# Patient Record
Sex: MALE | Race: WHITE | NOT HISPANIC OR LATINO | Employment: FULL TIME | URBAN - METROPOLITAN AREA
[De-identification: names, ages, dates, MRNs, and addresses within clinical notes are randomized per-mention and may not be internally consistent; named-entity substitution may affect disease eponyms.]

---

## 2017-01-03 ENCOUNTER — OFFICE VISIT (OUTPATIENT)
Dept: OTOLARYNGOLOGY | Facility: CLINIC | Age: 42
End: 2017-01-03
Payer: COMMERCIAL

## 2017-01-03 DIAGNOSIS — R06.83 SNORING: ICD-10-CM

## 2017-01-03 DIAGNOSIS — J31.0 CHRONIC RHINITIS: Primary | ICD-10-CM

## 2017-01-03 PROCEDURE — 99202 OFFICE O/P NEW SF 15 MIN: CPT | Performed by: OTOLARYNGOLOGY

## 2017-01-03 RX ORDER — FLUTICASONE PROPIONATE 50 MCG
1-2 SPRAY, SUSPENSION (ML) NASAL DAILY
Qty: 3 BOTTLE | Refills: 1 | Status: SHIPPED | OUTPATIENT
Start: 2017-01-03 | End: 2018-06-27

## 2017-01-03 ASSESSMENT — ENCOUNTER SYMPTOMS
TINGLING: 0
BRUISES/BLEEDS EASILY: 0
NAUSEA: 0
DOUBLE VISION: 0
HEARTBURN: 0
CONSTITUTIONAL NEGATIVE: 1
DIZZINESS: 0
BLURRED VISION: 0
VOMITING: 0
COUGH: 0
TREMORS: 0
HEMOPTYSIS: 0

## 2017-01-03 ASSESSMENT — PAIN SCALES - GENERAL: PAINLEVEL: NO PAIN (0)

## 2017-01-03 NOTE — MR AVS SNAPSHOT
After Visit Summary   1/3/2017    Jesus Manuel Rogers    MRN: [id number]           Patient Information     Date Of Birth          1975        Visit Information        Provider Department      1/3/2017 9:00 AM Annie Hogue MD [facility]        Today's Diagnoses     Chronic rhinitis    -  1     Snoring            Follow-ups after your visit        Your next 10 appointments already scheduled     Mar 03, 2017  7:30 AM   Return Visit with Annie Hogue MD   [facility] ([facility]    [address] [place] [address] [id number] [phone]              Who to contact     If you have questions or need follow up information about today's clinic visit or your schedule please contact [facility] directly at [phone].  Normal or non-critical lab and imaging results will be communicated to you by LawPath[employer], letter or phone within 4 business days after the clinic has received the results. If you do not hear from us within 7 days, please contact the clinic through LawPathhart or phone. If you have a critical or abnormal lab result, we will notify you by phone as soon as possible.  Submit refill requests through [employer] or call your pharmacy and they will forward the refill request to us. Please allow 3 business days for your refill to be completed.          Additional Information About Your Visit        MyChart Information     [employer] gives you secure access to your electronic health record. If you see a primary care provider, you can also send messages to your care team and make appointments. If you have questions, please call your primary care clinic.  If you do not have a primary care provider, please call [phone] and they will assist you.      [employer] is an electronic gateway that provides easy, online access to your medical records. With [employer], you can request a clinic appointment, read your test results, renew a  prescription or communicate with your care team.     To access your existing account, please contact your [facility] or call [phone] for assistance.         Blood Pressure from Last 3 Encounters:   11/06/15 118/70   03/03/15 112/74   01/03/14 120/86    Weight from Last 3 Encounters:   11/06/15 91.173 kg (201 lb)   03/03/15 90.946 kg (200 lb 8 oz)   01/03/14 91.989 kg (202 lb 12.8 oz)              Today, you had the following     No orders found for display         Today's Medication Changes          These changes are accurate as of: 1/3/17  9:48 AM.  If you have any questions, ask your nurse or doctor.               Start taking these medicines.        Dose/Directions    fluticasone 50 MCG/ACT spray   Commonly known as:  FLONASE   Used for:  Chronic rhinitis        Dose:  1-2 spray   Spray 1-2 sprays into both nostrils daily   Quantity:  3 Bottle   Refills:  1            Where to get your medicines      These medications were sent to [facility] [address] [place] [address] [address] [facility]  [address] [facility] [address]     Phone:  [phone]    - fluticasone 50 MCG/ACT spray             Primary Care Provider Office Phone #    [facility] [phone]       No address on file        Thank you!     Thank you for choosing [facility]  for your care. Our goal is always to provide you with excellent care. Hearing back from our patients is one way we can continue to improve our services. Please take a few minutes to complete the written survey that you may receive in the mail after your visit with us. Thank you!             Your Updated Medication List - Protect others around you: Learn how to safely use, store and throw away your medicines at www.disposemymeds.org.          This list is accurate as of: 1/3/17  9:48 AM.  Always use your most recent med list.                   Brand Name Dispense Instructions for use    fluticasone 50 MCG/ACT  spray    FLONASE    3 Bottle    Spray 1-2 sprays into both nostrils daily

## 2017-01-03 NOTE — PROGRESS NOTES
HPI    This is a 41 year old pleasant patient who has been having snoring for the past a couple of years. Denies any sleep apnea, bleeding from nose, facial pressure, pain, post nasal drip, seasonal changes, runny nose, sneezing or coughing. No sore throat, surgeries, choking, or falling a sleep issues. He has a hx of broken nose a couple of times. Sleeps on his back or sometimes on his stomach. He sometimes chews tobacco. He is not a drinker or smoker.    Review of Systems   Constitutional: Negative.    HENT: Negative.    Eyes: Negative for blurred vision and double vision.   Respiratory: Negative for cough and hemoptysis.    Gastrointestinal: Negative for heartburn, nausea and vomiting.   Skin: Negative.    Neurological: Negative for dizziness, tingling and tremors.   Endo/Heme/Allergies: Negative for environmental allergies. Does not bruise/bleed easily.         Physical Exam   Constitutional: He is well-developed, well-nourished, and in no distress.   HENT:   Head: Normocephalic and atraumatic.   Right Ear: Tympanic membrane, external ear and ear canal normal. No drainage, swelling or tenderness. No middle ear effusion. No decreased hearing is noted.   Left Ear: Tympanic membrane, external ear and ear canal normal. No drainage, swelling or tenderness.  No middle ear effusion. No decreased hearing is noted.   Nose: Mucosal edema present. No rhinorrhea.   Mouth/Throat: Uvula is midline and oropharynx is clear and moist. No oropharyngeal exudate.   Eyes: Pupils are equal, round, and reactive to light.   Neck: Neck supple. No tracheal deviation present. No thyromegaly present.   Lymphadenopathy:     He has no cervical adenopathy.     Slight septal deviation and turbinate hypertrophy.    A/P  Snoring issues.  I will give him a topical nasal steroid and see him back in two months. In the meantime, he will try to see changing sleep position make any difference. He will try to sleep on his side. I do not see any apnea  issues.

## 2017-01-03 NOTE — NURSING NOTE
"Jesus Manuel Rogers's goals for this visit include:   Chief Complaint   Patient presents with     Snoring     hx of nasal fracture       He requests these members of his care team be copied on today's visit information: Clinic, [facility]      PCP: [facility]    Referring Provider:  Referred Self, MD  No address on file    Chief Complaint   Patient presents with     Snoring     hx of nasal fracture       Initial There were no vitals taken for this visit. Estimated body mass index is 28.05 kg/(m^2) as calculated from the following:    Height as of 11/6/15: 1.803 m (5' 11\").    Weight as of 11/6/15: 91.173 kg (201 lb).  BP completed using cuff size: NA (Not Taken)    Do you need any medication refills at today's visit? No    Yandy Jacob RN    "

## 2017-03-03 ENCOUNTER — OFFICE VISIT (OUTPATIENT)
Dept: OTOLARYNGOLOGY | Facility: CLINIC | Age: 42
End: 2017-03-03
Payer: COMMERCIAL

## 2017-03-03 VITALS — SYSTOLIC BLOOD PRESSURE: 139 MMHG | HEART RATE: 64 BPM | DIASTOLIC BLOOD PRESSURE: 89 MMHG

## 2017-03-03 DIAGNOSIS — R06.83 SNORING: ICD-10-CM

## 2017-03-03 DIAGNOSIS — J34.3 NASAL TURBINATE HYPERTROPHY: Primary | ICD-10-CM

## 2017-03-03 PROCEDURE — 99212 OFFICE O/P EST SF 10 MIN: CPT | Performed by: OTOLARYNGOLOGY

## 2017-03-03 NOTE — NURSING NOTE
"Jesus Manuel Rogers's goals for this visit include:   Chief Complaint   Patient presents with     RECHECK       He requests these members of his care team be copied on today's visit information: yes      PCP: Clinic, [facility]    Referring Provider:  No referring provider defined for this encounter.    Chief Complaint   Patient presents with     RECHECK       Initial /89  Pulse 64 Estimated body mass index is 28.03 kg/(m^2) as calculated from the following:    Height as of 11/6/15: 1.803 m (5' 11\").    Weight as of 11/6/15: 91.2 kg (201 lb).  Medication Reconciliation: complete    "

## 2017-03-03 NOTE — MR AVS SNAPSHOT
After Visit Summary   3/3/2017    Jesus Manuel Rogers    MRN: [id number]           Patient Information     Date Of Birth          1975        Visit Information        Provider Department      3/3/2017 7:30 AM Annie Hogue MD [facility]        Today's Diagnoses     Nasal turbinate hypertrophy    -  1    Snoring           Follow-ups after your visit        Who to contact     If you have questions or need follow up information about today's clinic visit or your schedule please contact [facility] directly at [phone].  Normal or non-critical lab and imaging results will be communicated to you by [employer], letter or phone within 4 business days after the clinic has received the results. If you do not hear from us within 7 days, please contact the clinic through [employer] or phone. If you have a critical or abnormal lab result, we will notify you by phone as soon as possible.  Submit refill requests through "[employer] or call your pharmacy and they will forward the refill request to us. Please allow 3 business days for your refill to be completed.          Additional Information About Your Visit        MyChart Information     "[employer] gives you secure access to your electronic health record. If you see a primary care provider, you can also send messages to your care team and make appointments. If you have questions, please call your primary care clinic.  If you do not have a primary care provider, please call [phone] and they will assist you.      "[employer] is an electronic gateway that provides easy, online access to your medical records. With "[employer] you can request a clinic appointment, read your test results, renew a prescription or communicate with your care team.     To access your existing account, please contact your [facility] or call [phone] for assistance.        Care EveryWhere ID     This is your Care EveryWhere  ID. This could be used by other organizations to access your [facility] medical records  [id number]        Your Vitals Were     Pulse                   64            Blood Pressure from Last 3 Encounters:   03/03/17 139/89   11/06/15 118/70   03/03/15 112/74    Weight from Last 3 Encounters:   11/06/15 91.2 kg (201 lb)   03/03/15 90.9 kg (200 lb 8 oz)   01/03/14 92 kg (202 lb 12.8 oz)              Today, you had the following     No orders found for display       Primary Care Provider Office Phone #    [facility] [phone]       No address on file        Thank you!     Thank you for choosing [facility]  for your care. Our goal is always to provide you with excellent care. Hearing back from our patients is one way we can continue to improve our services. Please take a few minutes to complete the written survey that you may receive in the mail after your visit with us. Thank you!             Your Updated Medication List - Protect others around you: Learn how to safely use, store and throw away your medicines at www.disposemymeds.org.          This list is accurate as of: 3/3/17  8:41 AM.  Always use your most recent med list.                   Brand Name Dispense Instructions for use    fluticasone 50 MCG/ACT spray    FLONASE    3 Bottle    Spray 1-2 sprays into both nostrils daily

## 2017-03-03 NOTE — PROGRESS NOTES
HPI    This 41 year old pleasant patient is here for the f/u. He did not use his topical nasal spray regularly. He is not sure if there is any changes in terms of having snoring for the past a couple of years. Denies any sleep apnea, bleeding from nose, facial pressure, pain, post nasal drip, seasonal changes, runny nose, sneezing or coughing. No sore throat, surgeries, choking, or falling a sleep issues. He has no difficulty falling asleep; staying asleep; and waking up. He does not feel tired when he wakes up. He has a hx of broken nose a couple of times. Sleeps on his back or sometimes on his stomach. He sometimes chews tobacco. He is not a drinker or smoker.    Review of Systems   Constitutional: Negative.    HENT: Negative.    Eyes: Negative for blurred vision and double vision.   Respiratory: Negative for cough and hemoptysis.    Gastrointestinal: Negative for heartburn, nausea and vomiting.   Skin: Negative.    Neurological: Negative for dizziness, tingling and tremors.   Endo/Heme/Allergies: Negative for environmental allergies. Does not bruise/bleed easily.         Physical Exam   Constitutional: He is well-developed, well-nourished, and in no distress.   HENT:   Head: Normocephalic and atraumatic.   Right Ear: Tympanic membrane, external ear and ear canal normal. No drainage, swelling or tenderness. No middle ear effusion. No decreased hearing is noted.   Left Ear: Tympanic membrane, external ear and ear canal normal. No drainage, swelling or tenderness.  No middle ear effusion. No decreased hearing is noted.   Nose: Mucosal edema present. No rhinorrhea.   Mouth/Throat: Uvula is midline and oropharynx is clear and moist. No oropharyngeal exudate.   Eyes: Pupils are equal, round, and reactive to light.   Neck: Neck supple. No tracheal deviation present. No thyromegaly present.   Lymphadenopathy:     He has no cervical adenopathy.     Slight septal deviation and turbinate hypertrophy.    A/P  Snoring  issues.  I will encourage him to continue using the topical nasal steroid and see him back in two months. In the f/u, there is concern about his sleep issues and apnea, we may request a sleep study. If his symptoms continue despite the medical treatment, we may consider turbinoplasty and septoplasty.

## 2018-06-27 ENCOUNTER — OFFICE VISIT (OUTPATIENT)
Dept: FAMILY MEDICINE | Facility: CLINIC | Age: 43
End: 2018-06-27
Payer: COMMERCIAL

## 2018-06-27 VITALS
WEIGHT: 211 LBS | TEMPERATURE: 98.5 F | HEART RATE: 81 BPM | HEIGHT: 71 IN | DIASTOLIC BLOOD PRESSURE: 82 MMHG | OXYGEN SATURATION: 96 % | SYSTOLIC BLOOD PRESSURE: 118 MMHG | BODY MASS INDEX: 29.54 KG/M2 | RESPIRATION RATE: 18 BRPM

## 2018-06-27 DIAGNOSIS — M72.2 PLANTAR FASCIITIS: Primary | ICD-10-CM

## 2018-06-27 PROCEDURE — 99213 OFFICE O/P EST LOW 20 MIN: CPT | Performed by: NURSE PRACTITIONER

## 2018-06-27 ASSESSMENT — PAIN SCALES - GENERAL: PAINLEVEL: NO PAIN (0)

## 2018-06-27 NOTE — PROGRESS NOTES
SUBJECTIVE:   Jesus Manuel Rogers is a 43 year old male who presents to clinic today for the following health issues:      Concern - RT heel issue  Onset: a month ago    Description:   Feels like a bruise on the bottom of heel    Intensity: mild    Progression of Symptoms:  improving    Accompanying Signs & Symptoms:  none    Previous history of similar problem:   no    Precipitating factors:   Worsened by: unsure    Alleviating factors:  Improved by: nothing    Therapies Tried and outcome: nothing    Sunday night pretty sore. Today improving. No specific episode to cause heel pain. 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 Wears tennis shoes at home due to hardwood and tile floors. He notes knee/feet pain when he doesn't do this.       Problem list and histories reviewed & adjusted, as indicated.  Additional history: as documented    Patient Active Problem List   Diagnosis     Tobacco abuse     Seborrheic dermatitis     CARDIOVASCULAR SCREENING; LDL GOAL LESS THAN 160     Past Surgical History:   Procedure Laterality Date     NO HISTORY OF SURGERY         Social History   Substance Use Topics     Smoking status: Never Smoker     Smokeless tobacco: Former User     Types: Chew     Alcohol use Yes      Comment: 1 beer weekly     Family History   Problem Relation Age of Onset     Hypertension Mother      Asthma No family hx of      C.A.D. No family hx of      Diabetes No family hx of      Cerebrovascular Disease No family hx of      Breast Cancer No family hx of      Cancer - colorectal No family hx of      Prostate Cancer No family hx of            Reviewed and updated as needed this visit by clinical staff  Tobacco  Allergies  Meds  Problems  Med Hx  Surg Hx  Fam Hx  Soc Hx        Reviewed and updated as needed this visit by Provider  Allergies  Meds  Problems         ROS:  MS    OBJECTIVE:     /82 (BP Location: Right arm, Patient Position:  "Sitting, Cuff Size: Adult Regular)  Pulse 81  Temp 98.5  F (36.9  C) (Oral)  Resp 18  Ht 1.803 m (5' 11\")  Wt 95.7 kg (211 lb)  SpO2 96%  BMI 29.43 kg/m2  Body mass index is 29.43 kg/(m^2).  GENERAL: healthy, alert and no distress  MS: no gross musculoskeletal defects noted. No foot pain but slight soreness on right posterior heel. Bilateral calves soft, non-tender.     Diagnostic Test Results:  none     ASSESSMENT/PLAN:       1. Plantar fasciitis  Likely this. Work on stretching, roll foot over a ball or canned foot, do calf raises with/without using a step. Call if not improving over next few weeks. Consider taping foot in flexed position at night      FUTURE APPOINTMENTS:       - Follow-up visit prn not improving    SABINE Mcduffie, NP-C  [facility]    "

## 2018-06-27 NOTE — MR AVS SNAPSHOT
"              After Visit Summary   6/27/2018    Jesus Manuel Rogers    MRN: [id number]           Patient Information     Date Of Birth          1975        Visit Information        Provider Department      6/27/2018 7:40 AM Anni Melchor NP [facility]        Today's Diagnoses     Plantar fasciitis    -  1       Follow-ups after your visit        Who to contact     If you have questions or need follow up information about today's clinic visit or your schedule please contact [facility] directly at [phone].  Normal or non-critical lab and imaging results will be communicated to you by Soapetshart, letter or phone within 4 business days after the clinic has received the results. If you do not hear from us within 7 days, please contact the clinic through [employer] or phone. If you have a critical or abnormal lab result, we will notify you by phone as soon as possible.  Submit refill requests through [employer] or call your pharmacy and they will forward the refill request to us. Please allow 3 business days for your refill to be completed.          Additional Information About Your Visit        MyChart Information     [employer] gives you secure access to your electronic health record. If you see a primary care provider, you can also send messages to your care team and make appointments. If you have questions, please call your primary care clinic.  If you do not have a primary care provider, please call [phone] and they will assist you.        Care EveryWhere ID     This is your Care EveryWhere ID. This could be used by other organizations to access your [place] medical records  [id number]        Your Vitals Were     Pulse Temperature Respirations Height Pulse Oximetry BMI (Body Mass Index)    81 98.5  F (36.9  C) (Oral) 18 1.803 m (5' 11\") 96% 29.43 kg/m2       Blood Pressure from Last 3 Encounters:   06/27/18 118/82   03/03/17 139/89   11/06/15 118/70    Weight from Last 3 Encounters: "   06/27/18 95.7 kg (211 lb)   11/06/15 91.2 kg (201 lb)   03/03/15 90.9 kg (200 lb 8 oz)              Today, you had the following     No orders found for display       Primary Care Provider Office Phone # Fax #    [facility] [phone] [address] [facility] [address]        Equal Access to Services     LOR MAO : Hadii aad ku hadasho Soomaali, waaxda luqadaha, qaybta kaalmada adeegyada, waxay idiin hayaan adeeg bhakti ibrahim. So [facility] [phone].    ATENCIÓN: Si habla español, tiene a hayes disposición servicios gratuitos de asistencia lingüística. Llame al [phone].    We comply with applicable federal civil rights laws and [place] laws. We do not discriminate on the basis of race, color, national origin, age, disability, sex, sexual orientation, or gender identity.            Thank you!     Thank you for choosing [facility]  for your care. Our goal is always to provide you with excellent care. Hearing back from our patients is one way we can continue to improve our services. Please take a few minutes to complete the written survey that you may receive in the mail after your visit with us. Thank you!             Your Updated Medication List - Protect others around you: Learn how to safely use, store and throw away your medicines at www.disposemymeds.org.      Notice  As of 6/27/2018  8:17 AM    You have not been prescribed any medications.

## 2019-08-23 ENCOUNTER — OFFICE VISIT (OUTPATIENT)
Dept: FAMILY MEDICINE | Facility: CLINIC | Age: 44
End: 2019-08-23
Payer: COMMERCIAL

## 2019-08-23 VITALS
RESPIRATION RATE: 18 BRPM | TEMPERATURE: 98.4 F | DIASTOLIC BLOOD PRESSURE: 80 MMHG | OXYGEN SATURATION: 99 % | WEIGHT: 208.7 LBS | HEIGHT: 71 IN | HEART RATE: 64 BPM | SYSTOLIC BLOOD PRESSURE: 121 MMHG | BODY MASS INDEX: 29.22 KG/M2

## 2019-08-23 DIAGNOSIS — Z00.00 ROUTINE GENERAL MEDICAL EXAMINATION AT A HEALTH CARE FACILITY: Primary | ICD-10-CM

## 2019-08-23 DIAGNOSIS — M79.672 PAIN IN BOTH FEET: ICD-10-CM

## 2019-08-23 DIAGNOSIS — M79.671 PAIN IN BOTH FEET: ICD-10-CM

## 2019-08-23 PROCEDURE — 99396 PREV VISIT EST AGE 40-64: CPT | Performed by: NURSE PRACTITIONER

## 2019-08-23 ASSESSMENT — PAIN SCALES - GENERAL: PAINLEVEL: NO PAIN (0)

## 2019-08-23 ASSESSMENT — MIFFLIN-ST. JEOR: SCORE: 1848.79

## 2019-08-23 NOTE — PROGRESS NOTES
SUBJECTIVE:   CC: Jesus Manuel Rogers is an 44 year old male who presents for preventive health visit.     Healthy Habits:    Do you get at least three servings of calcium containing foods daily (dairy, green leafy vegetables, etc.)? yes    Amount of exercise or daily activities, outside of work: 2 day(s) per week-activity with kids    Problems taking medications regularly No    Medication side effects: No    Have you had an eye exam in the past two years? no    Do you see a dentist twice per year? yes    Do you have sleep apnea, excessive snoring or daytime drowsiness?no, wife may say he snores. Feels rested in the morning    Has some anterior foot aching in the mornings that goes away after moving around. Also some arch pain that comes and goes. Feels he has supportive shoes, he will try stretching. Advised this sounds like arthritis in the feet, maybe plantar fasciitis in arches. He denies injury. Monitor for now. Did not assess feet today for this.     Colonoscopy due when turn 48    Today's PHQ-2 Score:   PHQ-2 ( 1999 [employer]) 8/23/2019 6/27/2018   Q1: Little interest or pleasure in doing things 0 0   Q2: Feeling down, depressed or hopeless 0 0   PHQ-2 Score 0 0       Abuse: Current or Past(Physical, Sexual or Emotional)- No  Do you feel safe in your environment? Yes    Social History     Tobacco Use     Smoking status: Never Smoker     Smokeless tobacco: Former User     Types: Chew   Substance Use Topics     Alcohol use: Yes     Comment: 1 beer weekly     If you drink alcohol do you typically have >3 drinks per day or >7 drinks per week? No                      Last PSA: No results found for: PSA    Reviewed orders with patient. Reviewed health maintenance and updated orders accordingly - Yes  Lab work is in process  Labs reviewed in EPIC    Reviewed and updated as needed this visit by clinical staff  Tobacco  Allergies  Meds  Problems  Med Hx  Surg Hx  Fam Hx  Soc Hx          Reviewed and updated as needed  "this visit by Provider  Tobacco  Allergies  Meds  Problems  Med Hx  Surg Hx  Fam Hx            ROS:  CONSTITUTIONAL: NEGATIVE for fever, chills, change in weight  INTEGUMENTARY/SKIN: NEGATIVE for worrisome rashes, moles or lesions  EYES: NEGATIVE for vision changes or irritation  ENT: NEGATIVE for ear, mouth and throat problems  RESP: NEGATIVE for significant cough or SOB  CV: NEGATIVE for chest pain, palpitations or peripheral edema  GI: NEGATIVE for nausea, abdominal pain, heartburn, or change in bowel habits   male: negative for dysuria, hematuria, decreased urinary stream, erectile dysfunction, urethral discharge  MUSCULOSKELETAL: NEGATIVE for significant arthralgias or myalgia  NEURO: NEGATIVE for weakness, dizziness or paresthesias  PSYCHIATRIC: NEGATIVE for changes in mood or affect    OBJECTIVE:   /80 (BP Location: Right arm, Patient Position: Sitting, Cuff Size: Adult Large)   Pulse 64   Temp 98.4  F (36.9  C) (Oral)   Resp 18   Ht 1.803 m (5' 11\")   Wt 94.7 kg (208 lb 11.2 oz)   SpO2 99%   BMI 29.11 kg/m    EXAM:  GENERAL: healthy, alert and no distress  EYES: Eyes grossly normal to inspection, PERRL and conjunctivae and sclerae normal  HENT: ear canals and TM's normal, nose and mouth without ulcers or lesions  NECK: no adenopathy, no asymmetry, masses, or scars and thyroid normal to palpation  RESP: lungs clear to auscultation - no rales, rhonchi or wheezes  CV: regular rate and rhythm, normal S1 S2, no S3 or S4, no murmur, click or rub, no peripheral edema and peripheral pulses strong  ABDOMEN: soft, nontender, no hepatosplenomegaly, no masses and bowel sounds normal  MS: no gross musculoskeletal defects noted, no edema  SKIN: no suspicious lesions or rashes  NEURO: Normal strength and tone, mentation intact and speech normal  PSYCH: mentation appears normal, affect normal/bright    Diagnostic Test Results:  Labs reviewed in Epic  No results found for this or any previous visit " "(from the past 24 hour(s)).    ASSESSMENT/PLAN:   1. Routine general medical examination at a health care facility  Return for fasting labs  - Lipid panel reflex to direct LDL Fasting; Future  - PSA, screen; Future  - Basic metabolic panel; Future    2. Pain in both feet  Likely arthritis monitor for now. Did not assess today      COUNSELING:  Reviewed preventive health counseling, as reflected in patient instructions    Estimated body mass index is 29.11 kg/m  as calculated from the following:    Height as of this encounter: 1.803 m (5' 11\").    Weight as of this encounter: 94.7 kg (208 lb 11.2 oz).         reports that he has never smoked. He has quit using smokeless tobacco. His smokeless tobacco use included chew.      Counseling Resources:  ATP IV Guidelines  Pooled Cohorts Equation Calculator  FRAX Risk Assessment  ICSI Preventive Guidelines  Dietary Guidelines for Americans, 2010  USDA's MyPlate  ASA Prophylaxis  Lung CA Screening    SABINE Mcduffie, NP-C  [facility]    "

## 2019-08-27 DIAGNOSIS — Z00.00 ROUTINE GENERAL MEDICAL EXAMINATION AT A HEALTH CARE FACILITY: ICD-10-CM

## 2019-08-27 LAB
ANION GAP SERPL CALCULATED.3IONS-SCNC: 6 MMOL/L (ref 3–14)
BUN SERPL-MCNC: 15 MG/DL (ref 7–30)
CALCIUM SERPL-MCNC: 8.9 MG/DL (ref 8.5–10.1)
CHLORIDE SERPL-SCNC: 106 MMOL/L (ref 94–109)
CHOLEST SERPL-MCNC: 201 MG/DL
CO2 SERPL-SCNC: 28 MMOL/L (ref 20–32)
CREAT SERPL-MCNC: 1 MG/DL (ref 0.66–1.25)
GFR SERPL CREATININE-BSD FRML MDRD: 90 ML/MIN/{1.73_M2}
GLUCOSE SERPL-MCNC: 94 MG/DL (ref 70–99)
HDLC SERPL-MCNC: 45 MG/DL
LDLC SERPL CALC-MCNC: 123 MG/DL
NONHDLC SERPL-MCNC: 156 MG/DL
POTASSIUM SERPL-SCNC: 4.9 MMOL/L (ref 3.4–5.3)
PSA SERPL-ACNC: 0.62 UG/L (ref 0–4)
SODIUM SERPL-SCNC: 140 MMOL/L (ref 133–144)
TRIGL SERPL-MCNC: 163 MG/DL

## 2019-08-27 PROCEDURE — 80061 LIPID PANEL: CPT | Performed by: NURSE PRACTITIONER

## 2019-08-27 PROCEDURE — 80048 BASIC METABOLIC PNL TOTAL CA: CPT | Performed by: NURSE PRACTITIONER

## 2019-08-27 PROCEDURE — 36415 COLL VENOUS BLD VENIPUNCTURE: CPT | Performed by: NURSE PRACTITIONER

## 2019-08-27 PROCEDURE — G0103 PSA SCREENING: HCPCS | Performed by: NURSE PRACTITIONER

## 2019-10-07 ENCOUNTER — OFFICE VISIT (OUTPATIENT)
Dept: FAMILY MEDICINE | Facility: CLINIC | Age: 44
End: 2019-10-07
Payer: COMMERCIAL

## 2019-10-07 VITALS
SYSTOLIC BLOOD PRESSURE: 130 MMHG | BODY MASS INDEX: 28.56 KG/M2 | OXYGEN SATURATION: 97 % | HEART RATE: 63 BPM | WEIGHT: 204 LBS | DIASTOLIC BLOOD PRESSURE: 82 MMHG | TEMPERATURE: 98.2 F | HEIGHT: 71 IN | RESPIRATION RATE: 18 BRPM

## 2019-10-07 DIAGNOSIS — K64.5 THROMBOSED EXTERNAL HEMORRHOIDS: Primary | ICD-10-CM

## 2019-10-07 PROCEDURE — 46083 INC THROMBOSED HROID XTRNL: CPT | Performed by: FAMILY MEDICINE

## 2019-10-07 PROCEDURE — 99213 OFFICE O/P EST LOW 20 MIN: CPT | Mod: 25 | Performed by: FAMILY MEDICINE

## 2019-10-07 ASSESSMENT — MIFFLIN-ST. JEOR: SCORE: 1827.47

## 2019-10-07 NOTE — PROGRESS NOTES
"Subjective     Jesus Manuel Rogers is a 44 year old male who presents to clinic today for the following health issues:    HPI   Concern - Hemorrhoids   Onset: 1 week now     Description:   Bleeding present - \"not really pain\". More of a flare up.     Progression of Symptoms:  worsening    Previous history of similar problem:   Over a year or two     Therapies Tried and outcome: OTC creams     Patient reports that he has experienced Hemorrhoids twice before. As of today, he reports that it flared up a week ago. Denies itchiness. He states that he has regular bowel movements with no signs of blood in the stool. He has tried applying Hydrocortisone bid with no relief. Maintains fluids well.      Reviewed and updated as needed this visit by Provider  Tobacco  Allergies  Meds  Med Hx  Surg Hx  Fam Hx  Soc Hx        Review of Systems   10 point ROS of systems including Constitutional, Eyes, Respiratory, Cardiovascular, Gastroenterology, Genitourinary, Integumentary, Muscularskeletal, Psychiatric were all negative except for pertinent positives noted in my HPI.    This document serves as a record of the services and decisions personally performed and made by Elsa Mann MD. It was created on her behalf by Nasreen Akins, trained medical scribe. The creation of this document is based on the provider's statements to the medical scribe.        Objective    /82 (BP Location: Right arm, Patient Position: Sitting, Cuff Size: Adult Large)   Pulse 63   Temp 98.2  F (36.8  C) (Oral)   Resp 18   Ht 1.803 m (5' 11\")   Wt 92.5 kg (204 lb)   SpO2 97%   BMI 28.45 kg/m    Body mass index is 28.45 kg/m .  Physical Exam   GENERAL: healthy, alert and no distress  RESP: lungs clear to auscultation - no rales, rhonchi or wheezes  HEART:   RRR without murmur.  ABD:  Soft, nontender, no masses.  RECTAL right sided thrombosed hemorrhoid (2 cm) external - tenderness to palpation.      PROCEDURE:    Consent obtained from " "patient.  Placed in the right lateral position.  1% xylocaine injected in overlying skin for anesthetic effect.  Betadine scrub performed.  Incision with 15 blade performed and large clot removed with flattening of the hemorrhoid noted.  Marsupialization of the incision site performed and tolerated well.  Gauze to area.        Assessment & Plan     1. Thrombosed external hemorrhoids  Incision and removal of thrombosis of hemorrhoid as noted above.  Sitz baths recommended.  hydrocortisone topically recommended.  Follow up if recurrent swelling/enlargement noted.    - hydrocortisone (ANUSOL-HC) 2.5 % cream; Place rectally 2 times daily as needed for hemorrhoids  Dispense: 30 g; Refill: 0  - EXCISION EXTERNAL THROMBOTIC HEMORRHOID     BMI:   Estimated body mass index is 28.45 kg/m  as calculated from the following:    Height as of this encounter: 1.803 m (5' 11\").    Weight as of this encounter: 92.5 kg (204 lb).   Weight management plan: Discussed healthy diet and exercise guidelines       Patient Instructions     Apply hydrocortisone topically to the hemorrhoid area twice a day after cleansing.   Keep bowel movements regular and stools soft with fiber and plenty of water in diet.                    Return in about 1 week (around 10/14/2019) for as needed for persistent symptoms.        The information in this document, created by the medical scribe, Nasreen Akins, for me, accurately reflects the services I personally performed and the decisions made by me. I have reviewed and approved this document for accuracy prior to leaving the patient care area. 10:42 AM 10/7/2019    Elsa Mann MD  [facility]      "

## 2019-10-07 NOTE — PATIENT INSTRUCTIONS
Apply hydrocortisone topically to the hemorrhoid area twice a day after cleansing.   Keep bowel movements regular and stools soft with fiber and plenty of water in diet.        Patient Education     Thrombosed Hemorrhoids    Hemorrhoids are swollen veins in the lower rectum and anus. They're similar to varicose veins that form in the legs. Hemorrhoids can happen inside the rectum (internal hemorrhoids). Or one may form at the anal opening (external hemorrhoids). They may bleed, but most hemorrhoids aren't cause for concern. But a small blood clot (thrombus) can develop in an external hemorrhoid. This may lead to severe pain and sometimes bleeding.  When to go to the emergency room (ER)  If you have severe pain or excessive bleeding, seek medical care right away.  What to expect in the ER  A healthcare provider is likely to check your anus and rectum using a thin, lighted tube (anoscope or proctoscope). You will get a local pain reliever (anesthetic) to ease any mild pain.  Treatment  Treatment recommendations include:    If the blood clot has formed within the past 48 to 72 hours, your healthcare provider may remove it from within the hemorrhoid. This is a simple procedure that can ease pain. You will have a local anesthetic to keep you pain-free during the procedure. The provider makes a small cut (incision) in the skin and removes the blood clot. Stitches are generally not needed.    If more than 72 hours have passed, your healthcare provider will suggest home treatments. Simple home treatments can ease your pain. These may include warm baths, ointments, suppositories, and witch hazel compresses. Many thrombosed hemorrhoids go away on their own in a few weeks.    If you have bleeding that continues or painful hemorrhoids, talk with your healthcare provider. Possible treatment may include banding, ligation, or removal (hemorrhoidectomy).  Tips for preventing hemorrhoids  Tips include:    Eat foods that are high in  fiber and use fiber supplements to help prevent constipation.    Drink plenty of liquids.    Get regular exercise to help prevent constipation and promote good bowel function.  Date Last Reviewed: 8/1/2018 2000-2018 [employer] [address] [place] [address], [place] [address]. All rights reserved. This information is not intended as a substitute for professional medical care. Always follow your healthcare professional's instructions.           Patient Education     Treating Hemorrhoids: Self-Care    Follow your healthcare provider s advice about caring for your hemorrhoids at home. Some treatments help relieve symptoms right away. Others involve making changes in your diet and exercise habits. These can help ease constipation and prevent hemorrhoid symptoms from coming back.  Relieving symptoms  Your healthcare provider may prescribe anti-inflammatory medicine to help ease your symptoms. The following tips will also help relieve pain and swelling.    Take sitz baths. Taking a sitz bath means sitting in a few inches of warm bath water. Soaking for 10 minutes twice a day can provide welcome relief from painful hemorrhoids. It can also help the area stay clean.    Develop good bowel habits. Use the bathroom when you need to. Don t ignore the urge to move your bowels. This can lead to constipation, hard stools, and straining. Also, don t read while on the toilet. Sit only as long as needed. Wipe gently with soft, unscented toilet tissue or baby wipes.    Use ice packs. Placing an ice pack on a thrombosed external hemorrhoid can help relieve pain right away. It will also help reduce the blood clot. Use the ice for 15 to 20 minutes at a time. Keep a cloth between the ice and your skin to prevent skin damage.    Use other measures. Laxatives and enemas can help ease constipation. But use them only on your healthcare provider s advice. For symptom relief, try using cotton pads soaked in witch hazel. These are  available at most drugstores. Over-the-counter hemorrhoid ointments and petroleum jelly can also provide relief.  Add fiber to your diet  Adding fiber to your diet can help relieve constipation by making stools softer and easier to pass. To increase your fiber intake, your healthcare provider may recommend a bulking agent, such as psyllium. This is a high-fiber supplement available at most grocery stores and drugstores. Eating more fiber-rich foods will also help. There are two types of fiber:    Insoluble fiber is the main ingredient in bulking agents. It s also found in foods such as wheat bran, whole-grain breads, fresh fruits, and vegetables.    Soluble fiber is found in foods such as oat bran. Although soluble fiber is good for you, it may not ease constipation as much as foods high in insoluble fiber.  Drink more water  Along with a high-fiber diet, drinking more water can help ease constipation. This is because insoluble fiber absorbs water, making stools soft and bulky. Be sure to drink plenty of water throughout the day. Drinking fruit juices, such as prune juice or apple juice, can also help prevent constipation.  Get more exercise  Regular exercise aids digestion and helps prevent constipation. It s also great for your health. So talk with your healthcare provider about starting an exercise program. Low-impact activities, such as swimming or walking, are good places to start. Take it easy at first. And remember to drink plenty of water when you exercise.  High-fiber foods  High-fiber foods offer many benefits. By making your stools softer, they help heal and prevent swollen hemorrhoids. They may also help reduce the risk of colon and rectal cancer. Best of all, they re usually low in calories and taste great. Here are some examples of fiber-rich foods.    Whole grains, such as wheat bran, corn bran, and brown rice.    Vegetables, especially carrots, broccoli, cabbage, and peas.    Fruits, such as apples,  bananas, raisins, peaches, and pears.    Nuts and legumes, especially peanuts, lentils, and kidney beans.  Easy ways to add fiber  The tips below offer some simple ways to add more high-fiber foods to your meals.    Start your day with a high-fiber breakfast. Eat a wheat bran cereal along with a sliced banana. Or, try peanut butter on whole-wheat toast.    Eat carrot sticks for snacks. They re easy to prepare, taste great, and are low in calories.    Use whole-grain breads instead of white bread for sandwiches.    Eat fruits for treats. Try an apple and some raisins instead of a candy bar.   Date Last Reviewed: 7/1/2016 2000-2018 [employer]. [address] [place] [address]. All rights reserved. This information is not intended as a substitute for professional medical care. Always follow your healthcare professional's instructions.               At [facility], we strive to deliver an exceptional experience to you, every time we see you.  If you receive a survey in the mail, please send us back your thoughts. We really do value your feedback.    Your care team:     Family Medicine   NADYA Joel MD Emily Bunt, APRN CNP S. MD Elsa Reddy MD Angela Wermerskirchen, MD         Clinic hours: Monday - Wednesday 7 am-7 pm   Thursdays and Fridays 7 am-5 pm.     [facility]: Monday - Friday 11 am-9 pm,   Saturday and Sunday 9 am-5 pm.    [facility]: Monday -Thursday 8 am-8 pm; Friday 8 am-6 pm; Saturday and Sunday 9 am-5 pm.     [facility]: Monday - Thursday 8 am - 7 pm; Friday 8 am - 6 pm    Clinic: [phone]   [facility]: [phone]   [facility]: [phone]

## 2019-10-07 NOTE — Clinical Note
Can you verify this is correct code for procedure.  I looked for incise hemorrhoid but said it was an invalid code.Thanks,PSGRACE

## 2019-11-12 ENCOUNTER — OFFICE VISIT (OUTPATIENT)
Dept: FAMILY MEDICINE | Facility: CLINIC | Age: 44
End: 2019-11-12
Payer: COMMERCIAL

## 2019-11-12 VITALS
HEIGHT: 71 IN | TEMPERATURE: 98.3 F | OXYGEN SATURATION: 100 % | WEIGHT: 208 LBS | DIASTOLIC BLOOD PRESSURE: 86 MMHG | SYSTOLIC BLOOD PRESSURE: 132 MMHG | BODY MASS INDEX: 29.12 KG/M2 | HEART RATE: 66 BPM

## 2019-11-12 DIAGNOSIS — Z30.09 ENCOUNTER FOR VASECTOMY COUNSELING: Primary | ICD-10-CM

## 2019-11-12 PROCEDURE — 99213 OFFICE O/P EST LOW 20 MIN: CPT | Performed by: FAMILY MEDICINE

## 2019-11-12 RX ORDER — DIAZEPAM 10 MG
TABLET ORAL
Qty: 1 TABLET | Refills: 0 | Status: SHIPPED | OUTPATIENT
Start: 2019-11-12 | End: 2023-11-27

## 2019-11-12 ASSESSMENT — MIFFLIN-ST. JEOR: SCORE: 1845.61

## 2019-11-12 NOTE — PROGRESS NOTES
Subjective     Jesus Manuel Rogers is a 44 year old male who presents to clinic today for the following health issues:    HPI     Patient presents for Vasectomy Consult.    SUBJECTIVE:  Jesus Manuel Rogers, a 44 year old male, is seen today in consultatiron regarding vasectomy.  Was seen without his spouse today.  He was presented with the vasectomy instruction packet when roomed; we subsequently discussed the procedure in detail together, including potential risks, expected recovery course, etc.  He understands the small, but present, risk of bleeding, infection, post-vasectomy pain, failure rate, and expresses understanding of the need for back-up contraception until sperm counts are clear at 6-8 weeks.  All concerns addressed and questions answered. He was encouraged to check with his insurance carrier on details of coverage.    Past medical, family, and social history reviewed in his chart.  Review of systems otherwise negative.    OBJECTIVE:    GEN'L:  Alert, pleasant, upbeat, and in no apparent discomfort.  SKIN:  I note only benign skin findings. No unusual rashes or suspicious skin lesions noted. Nails appear normal.  :  Normal penis and scrotum without obvious abnormality.   Vas deferens palpable without difficulty.   Hydrocele present: neither  ASSESSMENT:  Vasectomy consultation    PLAN:  The patient will schedule a vasectomy at his convenience.  Instructed to take valium 10 mg orally 30-45 minutes before the procedure.  He will contact me with any questions or concerns ahead of time.    WEDNY Delgadillo MD

## 2019-11-26 ENCOUNTER — OFFICE VISIT (OUTPATIENT)
Dept: FAMILY MEDICINE | Facility: CLINIC | Age: 44
End: 2019-11-26
Payer: COMMERCIAL

## 2019-11-26 VITALS
WEIGHT: 208 LBS | OXYGEN SATURATION: 100 % | HEIGHT: 71 IN | TEMPERATURE: 98.4 F | SYSTOLIC BLOOD PRESSURE: 128 MMHG | DIASTOLIC BLOOD PRESSURE: 78 MMHG | BODY MASS INDEX: 29.12 KG/M2 | HEART RATE: 66 BPM

## 2019-11-26 DIAGNOSIS — Z30.2 ENCOUNTER FOR VASECTOMY: Primary | ICD-10-CM

## 2019-11-26 PROCEDURE — 99207 ZZC DROP WITH A PROCEDURE: CPT | Mod: 25 | Performed by: FAMILY MEDICINE

## 2019-11-26 PROCEDURE — 88302 TISSUE EXAM BY PATHOLOGIST: CPT | Performed by: FAMILY MEDICINE

## 2019-11-26 PROCEDURE — 55250 REMOVAL OF SPERM DUCT(S): CPT | Performed by: FAMILY MEDICINE

## 2019-11-26 ASSESSMENT — MIFFLIN-ST. JEOR: SCORE: 1845.61

## 2019-11-26 NOTE — PROGRESS NOTES
Subjective     Jesus Manuel Rogers is a 44 year old male who presents to clinic today for the following health issues:    HPI     Patient presents for Vasectomy Procedure.    Jesus Manuel Rogers is a 44 year old male here for vasectomy.     He has been in previously for vasectomy consult in which we discussed the no-scapel procedure, including risks and benefits, and other options of birth control.  All questions have been answered and the consent form is signed.  Premedicated with 10 mg oral valium No           Procedure:  He was placed in the supine position on the procedure table.  He was prepped and draped in the usual sterile fashion.  The right vas was identified and brought up to the surface.  The skin overlying the vas was anesthetized with 1% lidocaine with no epinephrine and a vas-block permormed through intact skin.  The no-scaple vas clamp was used to grasp the vas and the dissecting instrument was used to puncture the skin and create a 5 mm incision. The vas deferens was isolated in the usual no-scalpel fashion, using pressure and electrocautery for hemostasis.  Surgical clips were placed, 1 proximal and 1 distal.  A 5 mm segment of the vas was removed, both ends were cauterized and dropped down into the scrotum.  The procedure was repeated for the left vas.  There were no complications, and bleeding was negligible.  He tolerated the procedure well.  The midline incision was clamped with curved hemostats for 30 seconds.  Bacitracin and gauze applied to the wound.         A:  Vasectomy         P:  He was given standard post-vasectomy instructions, including  4 containers for post vas specimens.  He should apply ice   and wear support for the next 2-3 days.  He should  abstain from sexual intercourse and any heavy lifting for the next week.  Call or return to clinic for any  problems.  The excised specimens were sent for pathology.  He may use over the counter tylenol +/- ibuprofen for pain.  He is not  considered  sterile until follow up vas specimens in 8-12 weeks are free of sperm.    WENDY Delgadillo MD

## 2019-11-26 NOTE — PATIENT INSTRUCTIONS
Post-Vasectomy Instructions:    - Avoid crouching or lifting more than 5-10# for a few days to give the wounds a chance to heal.  Otherwise this is an area that tends to swell and get painful.  Obviously not a desired part of the healing process.    - OK to use over the counter analgesics (Tylenol, ibuprofen, naproxen) for discomfort.  Ice packs or a bag of frozen vegetables a few times a day can help with swelling and discomfort as well.    - After 3-4 days you can gradually return to normal activity, letting pain be your guide.    - We will provide a kit check a semen sample to ensure the vasectomy was successful.  Without a negative sample I cannot guarantee the success of the procedure.  Submit the sample approximately 8-10 weeks after the procedure.  If clear, no further sample is needed.  If sperm is still present we will wait another 4-6 weeks and recheck.  The majority of men are clear by 12 weeks, but it may take as long as 6 months for sperm to clear.  During this time, continue with another form of birth control.    - At any time along the healing or post-vasectomy process please do not hesitate to contact me with any questions or concerns.

## 2019-12-02 LAB — COPATH REPORT: NORMAL

## 2019-12-02 NOTE — RESULT ENCOUNTER NOTE
Jesus Manuel,  Pathology confirmed a successful vasectomy.  Hope you are healing up well.    WENDY Delgadillo MD

## 2019-12-18 ENCOUNTER — NURSE TRIAGE (OUTPATIENT)
Dept: NURSING | Facility: CLINIC | Age: 44
End: 2019-12-18

## 2019-12-18 NOTE — TELEPHONE ENCOUNTER
Caller reports blood in ejaculate one time yesterday  without  Any other symptoms; had vasectomy in 3 weeks ago without any complications   Protocol reviewed and cited   Cited [facility] web site for further information  Advised to follow up with provider if  symptoms persist or he develops any new or worsening symptoms    Caller understands ad will comply   Elizabeth Morales RN  FNA         Reason for Disposition    Blood in semen    Additional Information    Penis discharge    Protocols used: PENIS AND SCROTUM SYMPTOMS-A-, STDS - GUIDELINE [id number]

## 2020-12-06 ENCOUNTER — HEALTH MAINTENANCE LETTER (OUTPATIENT)
Age: 45
End: 2020-12-06

## 2021-09-26 ENCOUNTER — HEALTH MAINTENANCE LETTER (OUTPATIENT)
Age: 46
End: 2021-09-26

## 2022-01-15 ENCOUNTER — HEALTH MAINTENANCE LETTER (OUTPATIENT)
Age: 47
End: 2022-01-15

## 2023-04-23 ENCOUNTER — HEALTH MAINTENANCE LETTER (OUTPATIENT)
Age: 48
End: 2023-04-23

## 2023-11-27 ENCOUNTER — OFFICE VISIT (OUTPATIENT)
Dept: FAMILY MEDICINE | Facility: CLINIC | Age: 48
End: 2023-11-27
Payer: COMMERCIAL

## 2023-11-27 VITALS
OXYGEN SATURATION: 100 % | TEMPERATURE: 97.4 F | BODY MASS INDEX: 29.69 KG/M2 | DIASTOLIC BLOOD PRESSURE: 94 MMHG | WEIGHT: 212.1 LBS | HEART RATE: 53 BPM | HEIGHT: 71 IN | RESPIRATION RATE: 14 BRPM | SYSTOLIC BLOOD PRESSURE: 141 MMHG

## 2023-11-27 DIAGNOSIS — Z13.1 SCREENING FOR DIABETES MELLITUS: ICD-10-CM

## 2023-11-27 DIAGNOSIS — Z13.220 SCREENING FOR HYPERLIPIDEMIA: ICD-10-CM

## 2023-11-27 DIAGNOSIS — Z23 NEED FOR PROPHYLACTIC VACCINATION AND INOCULATION AGAINST INFLUENZA: ICD-10-CM

## 2023-11-27 DIAGNOSIS — Z12.5 SCREENING FOR PROSTATE CANCER: ICD-10-CM

## 2023-11-27 DIAGNOSIS — Z12.11 SCREEN FOR COLON CANCER: ICD-10-CM

## 2023-11-27 DIAGNOSIS — Z00.00 ROUTINE GENERAL MEDICAL EXAMINATION AT A HEALTH CARE FACILITY: Primary | ICD-10-CM

## 2023-11-27 LAB
ANION GAP SERPL CALCULATED.3IONS-SCNC: 13 MMOL/L (ref 7–15)
BUN SERPL-MCNC: 16.7 MG/DL (ref 6–20)
CALCIUM SERPL-MCNC: 9.5 MG/DL (ref 8.6–10)
CHLORIDE SERPL-SCNC: 102 MMOL/L (ref 98–107)
CHOLEST SERPL-MCNC: 212 MG/DL
CREAT SERPL-MCNC: 0.92 MG/DL (ref 0.67–1.17)
DEPRECATED HCO3 PLAS-SCNC: 24 MMOL/L (ref 22–29)
EGFRCR SERPLBLD CKD-EPI 2021: >90 ML/MIN/1.73M2
GLUCOSE SERPL-MCNC: 92 MG/DL (ref 70–99)
HBA1C MFR BLD: 5 % (ref 0–5.6)
HDLC SERPL-MCNC: 43 MG/DL
LDLC SERPL CALC-MCNC: 132 MG/DL
NONHDLC SERPL-MCNC: 169 MG/DL
POTASSIUM SERPL-SCNC: 4.9 MMOL/L (ref 3.4–5.3)
PSA SERPL DL<=0.01 NG/ML-MCNC: 0.62 NG/ML (ref 0–3.5)
SODIUM SERPL-SCNC: 139 MMOL/L (ref 135–145)
TRIGL SERPL-MCNC: 187 MG/DL

## 2023-11-27 PROCEDURE — 80048 BASIC METABOLIC PNL TOTAL CA: CPT | Performed by: INTERNAL MEDICINE

## 2023-11-27 PROCEDURE — 90715 TDAP VACCINE 7 YRS/> IM: CPT | Performed by: INTERNAL MEDICINE

## 2023-11-27 PROCEDURE — 99386 PREV VISIT NEW AGE 40-64: CPT | Mod: 25 | Performed by: INTERNAL MEDICINE

## 2023-11-27 PROCEDURE — 90686 IIV4 VACC NO PRSV 0.5 ML IM: CPT | Performed by: INTERNAL MEDICINE

## 2023-11-27 PROCEDURE — 90471 IMMUNIZATION ADMIN: CPT | Performed by: INTERNAL MEDICINE

## 2023-11-27 PROCEDURE — 83036 HEMOGLOBIN GLYCOSYLATED A1C: CPT | Performed by: INTERNAL MEDICINE

## 2023-11-27 PROCEDURE — 36415 COLL VENOUS BLD VENIPUNCTURE: CPT | Performed by: INTERNAL MEDICINE

## 2023-11-27 PROCEDURE — G0103 PSA SCREENING: HCPCS | Performed by: INTERNAL MEDICINE

## 2023-11-27 PROCEDURE — 80061 LIPID PANEL: CPT | Performed by: INTERNAL MEDICINE

## 2023-11-27 PROCEDURE — 90472 IMMUNIZATION ADMIN EACH ADD: CPT | Performed by: INTERNAL MEDICINE

## 2023-11-27 ASSESSMENT — ENCOUNTER SYMPTOMS
DIARRHEA: 0
HEADACHES: 0
NAUSEA: 0
PARESTHESIAS: 0
WEAKNESS: 0
CHILLS: 0
FREQUENCY: 0
HEARTBURN: 0
DIZZINESS: 0
CONSTIPATION: 0
ARTHRALGIAS: 0
COUGH: 0
ABDOMINAL PAIN: 0
SORE THROAT: 0
MYALGIAS: 0
DYSURIA: 0
PALPITATIONS: 0
HEMATURIA: 0
SHORTNESS OF BREATH: 0
NERVOUS/ANXIOUS: 0
JOINT SWELLING: 0
EYE PAIN: 0
HEMATOCHEZIA: 0
FEVER: 0

## 2023-11-27 ASSESSMENT — PAIN SCALES - GENERAL: PAINLEVEL: NO PAIN (0)

## 2023-11-27 NOTE — PROGRESS NOTES
Prior to immunization administration, verified patients identity using patient s name and date of birth. Please see Immunization Activity for additional information.     Screening Questionnaire for Adult Immunization    Are you sick today?   No   Do you have allergies to medications, food, a vaccine component or latex?   No   Have you ever had a serious reaction after receiving a vaccination?   No   Do you have a long-term health problem with heart, lung, kidney, or metabolic disease (e.g., diabetes), asthma, a blood disorder, no spleen, complement component deficiency, a cochlear implant, or a spinal fluid leak?  Are you on long-term aspirin therapy?   No   Do you have cancer, leukemia, HIV/AIDS, or any other immune system problem?   No   Do you have a parent, brother, or sister with an immune system problem?   No   In the past 3 months, have you taken medications that affect  your immune system, such as prednisone, other steroids, or anticancer drugs; drugs for the treatment of rheumatoid arthritis, Crohn s disease, or psoriasis; or have you had radiation treatments?   No   Have you had a seizure, or a brain or other nervous system problem?   No   During the past year, have you received a transfusion of blood or blood    products, or been given immune (gamma) globulin or antiviral drug?   No   For women: Are you pregnant or is there a chance you could become       pregnant during the next month?   No   Have you received any vaccinations in the past 4 weeks?   No     Immunization questionnaire answers were all negative.      Patient instructed to remain in clinic for 15 minutes afterwards, and to report any adverse reactions.     Screening performed by Leilani Vergara MA on 11/27/2023 at 8:45 AM.

## 2023-11-27 NOTE — PROGRESS NOTES
SUBJECTIVE:   Jesus Manuel is a 48 year old, presenting for the following:  Annual Visit and Imm/Inj (Flu Shot)        11/27/2023     7:54 AM   Additional Questions   Roomed by JARED Orellana   Accompanied by Self       Healthy Habits:     Getting at least 3 servings of Calcium per day:  Yes    Bi-annual eye exam:  NO    Dental care twice a year:  Yes    Sleep apnea or symptoms of sleep apnea:  None    Diet:  Regular (no restrictions)    Frequency of exercise:  2-3 days/week    Duration of exercise:  Less than 15 minutes    Taking medications regularly:  Yes    Medication side effects:  None    Additional concerns today:  No      Today's PHQ-2 Score:       11/27/2023     7:51 AM   PHQ-2 ( 1999 [employer])   Q1: Little interest or pleasure in doing things 0   Q2: Feeling down, depressed or hopeless 0   PHQ-2 Score 0   Q1: Little interest or pleasure in doing things Not at all   Q2: Feeling down, depressed or hopeless Not at all   PHQ-2 Score 0                   Have you ever done Advance Care Planning? (For example, a Health Directive, POLST, or a discussion with a medical provider or your loved ones about your wishes): No, advance care planning information given to patient to review.  Advanced care planning was discussed at today's visit.    Social History     Tobacco Use    Smoking status: Never    Smokeless tobacco: Former     Types: Chew   Substance Use Topics    Alcohol use: Yes     Comment: 1 beer weekly             11/27/2023     7:50 AM   Alcohol Use   Prescreen: >3 drinks/day or >7 drinks/week? No          No data to display                Last PSA:   PSA   Date Value Ref Range Status   08/27/2019 0.62 0 - 4 ug/L Final     Comment:     Assay Method:  Chemiluminescence using Siemens Vista analyzer       Reviewed orders with patient. Reviewed health maintenance and updated orders accordingly - Yes  Lab work is in process    Reviewed and updated as needed this visit by clinical staff   Tobacco  Allergies  Meds           "    Reviewed and updated as needed this visit by Provider                     Review of Systems   Constitutional:  Negative for chills and fever.   HENT:  Negative for congestion, ear pain, hearing loss and sore throat.    Eyes:  Negative for pain and visual disturbance.   Respiratory:  Negative for cough and shortness of breath.    Cardiovascular:  Negative for chest pain, palpitations and peripheral edema.   Gastrointestinal:  Negative for abdominal pain, constipation, diarrhea, heartburn, hematochezia and nausea.   Genitourinary:  Negative for dysuria, frequency, genital sores, hematuria, impotence, penile discharge and urgency.   Musculoskeletal:  Negative for arthralgias, joint swelling and myalgias.   Skin:  Negative for rash.   Neurological:  Negative for dizziness, weakness, headaches and paresthesias.   Psychiatric/Behavioral:  Negative for mood changes. The patient is not nervous/anxious.          OBJECTIVE:   BP (!) 141/94 (BP Location: Right arm, Patient Position: Sitting, Cuff Size: Adult Regular)   Pulse 53   Temp 97.4  F (36.3  C) (Tympanic)   Resp 14   Ht 1.791 m (5' 10.5\")   Wt 96.2 kg (212 lb 1.6 oz)   SpO2 100%   BMI 30.00 kg/m      Physical Exam  GENERAL: healthy, alert and no distress  EYES: Eyes grossly normal to inspection, PERRL and conjunctivae and sclerae normal  HENT: ear canals and TM's normal, nose and mouth without ulcers or lesions  NECK: no adenopathy, no asymmetry, masses, or scars and thyroid normal to palpation  RESP: lungs clear to auscultation - no rales, rhonchi or wheezes  CV: regular rate and rhythm, normal S1 S2, no S3 or S4, no murmur, click or rub, no peripheral edema and peripheral pulses strong  ABDOMEN: soft, nontender, no hepatosplenomegaly, no masses and bowel sounds normal  MS: no gross musculoskeletal defects noted, no edema  SKIN: no suspicious lesions or rashes  NEURO: Normal strength and tone, mentation intact and speech normal  PSYCH: mentation appears " "normal, affect normal/bright    Diagnostic Test Results:  Labs reviewed in Epic    ASSESSMENT/PLAN:   (Z00.00) Routine general medical examination at a health care facility  (primary encounter diagnosis)  Comment: He does not take any prescription medicines  He never had a colonoscopy  Blood pressure was slightly elevated today  He does not have any history of hypertension  He does have a family history of hypertension  He does chew tobacco  We discussed about diet and exercise  Discussed about cutting down the salt and exercising at least 180 minutes a week to help with his blood pressure also advised about  Home monitoring of blood pressure advised  Discussed about COVID/shingles/hepatitis B vaccinations  He is going to get tetanus and flu vaccine today  Plan: Basic metabolic panel  (Ca, Cl, CO2, Creat,         Gluc, K, Na, BUN)            (Z12.11) Screen for colon cancer  Comment:   Plan: Colonoscopy Screening  Referral            (Z23) Need for prophylactic vaccination and inoculation against influenza  Comment:   Plan:     (Z12.5) Screening for prostate cancer  Comment:   Plan: PSA, screen            (Z13.220) Screening for hyperlipidemia  Comment:   Plan: Lipid panel reflex to direct LDL Fasting            (Z13.1) Screening for diabetes mellitus  Comment:   Plan: Hemoglobin A1c            Patient has been advised of split billing requirements and indicates understanding: No      COUNSELING:   Reviewed preventive health counseling, as reflected in patient instructions       Regular exercise       Healthy diet/nutrition       Vision screening       Immunizations  Vaccinated for: Influenza           Colorectal cancer screening       Prostate cancer screening      BMI:   Estimated body mass index is 30 kg/m  as calculated from the following:    Height as of this encounter: 1.791 m (5' 10.5\").    Weight as of this encounter: 96.2 kg (212 lb 1.6 oz).   Weight management plan: Discussed healthy diet and " exercise guidelines      He reports that he has never smoked. He has quit using smokeless tobacco.  His smokeless tobacco use included chew.            Eze Tang MD  [facility]

## 2023-12-04 ENCOUNTER — TELEPHONE (OUTPATIENT)
Dept: GASTROENTEROLOGY | Facility: CLINIC | Age: 48
End: 2023-12-04
Payer: COMMERCIAL

## 2023-12-04 NOTE — TELEPHONE ENCOUNTER
"Endoscopy Scheduling Screen    Have you had a positive Covid test in the last 14 days?  No    Are you active on MyChart?   No    What insurance is in the chart?  Other:  [employer]    Ordering/Referring Provider:   FANTASMA BRITT        (If ordering provider performs procedure, schedule with ordering provider unless otherwise instructed. )    BMI: Estimated body mass index is 30 kg/m  as calculated from the following:    Height as of 11/27/23: 1.791 m (5' 10.5\").    Weight as of 11/27/23: 96.2 kg (212 lb 1.6 oz).     Sedation Ordered  moderate sedation.   If patient BMI > 50 do not schedule in [facility].    If patient BMI > 45 do not schedule at [facility].    Are you taking methadone or Suboxone?  No    Are you taking any prescription medications for pain 3 or more times per week?   No    Do you have a history of malignant hyperthermia or adverse reaction to anesthesia?  No     Have you been diagnosed or told you have pulmonary hypertension?   No    Do you have an LVAD?  No    Have you been told you have moderate to severe sleep apnea?  No    Have you been told you have COPD, asthma, or any other lung disease?  No    Do you have any heart conditions?  No     Have you ever had an organ transplant?   No    Have you ever had or are you awaiting a heart or lung transplant?   No    Have you had a stroke or transient ischemic attack (TIA aka \"mini stroke\" in the last 6 months?   No    Have you been diagnosed with or been told you have cirrhosis of the liver?   No    Are you currently on dialysis?   No    Do you need assistance transferring?   No    BMI: Estimated body mass index is 30 kg/m  as calculated from the following:    Height as of 11/27/23: 1.791 m (5' 10.5\").    Weight as of 11/27/23: 96.2 kg (212 lb 1.6 oz).     Is patients BMI > 40 and scheduling location [facility]?  No    Do you take an injectable medication for weight loss or diabetes (excluding insulin)?  No    Do you take the medication Naltrexone?  No    Do you take blood " thinners?  No       Prep   Are you currently on dialysis or do you have chronic kidney disease?  No    Do you have a diagnosis of diabetes?  No    Do you have a diagnosis of cystic fibrosis (CF)?  No    On a regular basis do you go 3 -5 days between bowel movements?  No    BMI > 40?  No    Preferred Pharmacy:    [facility] - [place], [place] - [address]  [address], [place] [address]  Phone: [phone] Fax: [phone] Alternate Fax: [phone]      Final Scheduling Details   Colonoscopy prep sent?  Standard MiraLAX    Procedure scheduled  Colonoscopy    Surgeon:  TOBIAS     Date of procedure:  02/13/2024     Pre-OP / PAC:   No - Not required for this site.    Location  [facility] - [facility] - Patient preference.    Sedation   Moderate Sedation - Per order.      Patient Reminders:   You will receive a call from a Nurse to review instructions and health history.  This assessment must be completed prior to your procedure.  Failure to complete the Nurse assessment may result in the procedure being cancelled.      On the day of your procedure, please designate an adult(s) who can drive you home stay with you for the next 24 hours. The medicines used in the exam will make you sleepy. You will not be able to drive.      You cannot take public transportation, ride share services, or non-medical taxi service without a responsible caregiver.  Medical transport services are allowed with the requirement that a responsible caregiver will receive you at your destination.  We require that drivers and caregivers are confirmed prior to your procedure.

## 2024-01-04 ENCOUNTER — TELEPHONE (OUTPATIENT)
Dept: FAMILY MEDICINE | Facility: CLINIC | Age: 49
End: 2024-01-04
Payer: COMMERCIAL

## 2024-01-04 NOTE — TELEPHONE ENCOUNTER
Patient calling to schedule appointment with provider to discuss elevated blood pressure concerns. At his last OV on 11/27/2023 it was noted that his BP was slightly elevated. Since then he has been monitoring his BP at home. It continues to be elevated in the 140-160/86-92 range. He hasn't really worked on dietary changes or incorporated more exercise.     Patient denies have any red flag symptoms at this time, but rather would like to discuss further with provider what his options are. He states that his mother and wife both have hypertension.     Virtual visit scheduled for 1/11/2024. Patient in agreement with plan. No further questions or concerns.    JARED Fields  [facility] Primary Care Triage

## 2024-01-11 ENCOUNTER — VIRTUAL VISIT (OUTPATIENT)
Dept: FAMILY MEDICINE | Facility: CLINIC | Age: 49
End: 2024-01-11
Payer: COMMERCIAL

## 2024-01-11 DIAGNOSIS — I10 ESSENTIAL HYPERTENSION WITH GOAL BLOOD PRESSURE LESS THAN 140/90: Primary | ICD-10-CM

## 2024-01-11 PROCEDURE — 99442 PR PHYSICIAN TELEPHONE EVALUATION 11-20 MIN: CPT | Mod: 93 | Performed by: FAMILY MEDICINE

## 2024-01-11 RX ORDER — HYDROCHLOROTHIAZIDE 25 MG/1
25 TABLET ORAL EVERY MORNING
Qty: 90 TABLET | Refills: 3 | Status: SHIPPED | OUTPATIENT
Start: 2024-01-11 | End: 2024-01-21

## 2024-01-11 NOTE — PROGRESS NOTES
Jesus Manuel is a 48 year old who is being evaluated via a billable telephone visit.      Subjective   Jesus Manuel is a 48 year old, presenting for the following health issues:  Blood Pressure Check        1/11/2024     7:40 AM   Additional Questions   Roomed by Terrance SCHULER   Accompanied by Self       History of Present Illness       Hypertension: He presents for follow up of hypertension.  He does check blood pressure  regularly outside of the clinic. Outside blood pressures have been over 140/90. He follows a low salt diet.           Review of Systems   Constitutional, HEENT, cardiovascular, pulmonary, GI, , musculoskeletal, neuro, skin, endocrine and psych systems are negative, except as otherwise noted.      Objective           Vitals:  No vitals were obtained today due to virtual visit.    Physical Exam   No exam due to telephone visit.    A/P:  (I10) Essential hypertension with goal blood pressure less than 140/90  (primary encounter diagnosis)  Comment:   Plan: Basic metabolic panel  (Ca, Cl, CO2, Creat,         Gluc, K, Na, BUN), Nutrition Referral,         hydrochlorothiazide (HYDRODIURIL) 25 MG tablet        Discussed low salt diet. Patient wants to see a dietician to help with diet. Will start hydrochlorothiazide 25 mg daily. Recheck bmp in 2-3 weeks. Patient will also have ancillary visit to recheck blood pressure in clinic. Will adjust if needed.    Kuldeep Salmeron MD    Total telephone visit time: 20 minutes

## 2024-01-21 DIAGNOSIS — I10 ESSENTIAL HYPERTENSION WITH GOAL BLOOD PRESSURE LESS THAN 140/90: ICD-10-CM

## 2024-01-22 RX ORDER — HYDROCHLOROTHIAZIDE 25 MG/1
25 TABLET ORAL EVERY MORNING
Qty: 90 TABLET | Refills: 3 | Status: SHIPPED | OUTPATIENT
Start: 2024-01-22

## 2024-01-29 ENCOUNTER — TELEPHONE (OUTPATIENT)
Dept: GASTROENTEROLOGY | Facility: CLINIC | Age: 49
End: 2024-01-29
Payer: COMMERCIAL

## 2024-01-29 NOTE — TELEPHONE ENCOUNTER
Pre assessment completed for upcoming procedure.   (Please see previous telephone encounter notes for complete details)      Procedure details:    Arrival time and facility location reviewed.    Pre op exam needed? N/A    Designated  policy reviewed. Instructed to have someone stay 6 hours post procedure.     COVID policy reviewed.      Medication review:    Medications reviewed. Please see supporting documentation below. Holding recommendations discussed (if applicable).       Prep for procedure:     Procedure prep instructions reviewed.        Any additional information needed:  N/A      Patient  verbalized understanding and had no questions or concerns at this time.      Erma Johnson RN  Endoscopy Procedure Pre Assessment RN  [phone] option 4

## 2024-01-29 NOTE — TELEPHONE ENCOUNTER
Pre visit planning completed.      Procedure details:    Patient scheduled for Colonoscopy  on 2/13/24.     Arrival time: 0915. Procedure time 1000    Pre op exam needed? N/A    Facility location: [facility]; [address], [place] [address]    Sedation type: Conscious sedation     Indication for procedure: Screening      Chart review:     Electronic implanted devices? No    Recent diagnosis of diverticulitis within the last 6 weeks? No    Diabetic? No      Medication review:    Anticoagulants? No    NSAIDS? No NSAID medications per patient's medication list.  RN will verify with pre-assessment call.    Other medication HOLDING recommendations:  N/A      Prep for procedure:     Bowel prep recommendation: Standard Miralax   Due to:  standard bowel prep.    Prep instructions sent via [employer]       Erma Johnson RN  Endoscopy Procedure Pre Assessment RN  [phone] option 4

## 2024-02-13 ENCOUNTER — HOSPITAL ENCOUNTER (OUTPATIENT)
Facility: AMBULATORY SURGERY CENTER | Age: 49
Discharge: HOME | End: 2024-02-13
Attending: SURGERY | Admitting: SURGERY
Payer: COMMERCIAL

## 2024-02-13 VITALS
RESPIRATION RATE: 16 BRPM | TEMPERATURE: 97.9 F | SYSTOLIC BLOOD PRESSURE: 121 MMHG | DIASTOLIC BLOOD PRESSURE: 85 MMHG | OXYGEN SATURATION: 93 % | HEART RATE: 57 BPM | WEIGHT: 201 LBS | BODY MASS INDEX: 28.43 KG/M2

## 2024-02-13 LAB — COLONOSCOPY: NORMAL

## 2024-02-13 PROCEDURE — 88305 TISSUE EXAM BY PATHOLOGIST: CPT | Performed by: PATHOLOGY

## 2024-02-13 PROCEDURE — G8907 PT DOC NO EVENTS ON DISCHARG: HCPCS

## 2024-02-13 PROCEDURE — G8918 PT W/O PREOP ORDER IV AB PRO: HCPCS

## 2024-02-13 PROCEDURE — 45385 COLONOSCOPY W/LESION REMOVAL: CPT

## 2024-02-13 RX ORDER — NALOXONE HYDROCHLORIDE 0.4 MG/ML
0.4 INJECTION, SOLUTION INTRAMUSCULAR; INTRAVENOUS; SUBCUTANEOUS
Status: DISCONTINUED | OUTPATIENT
Start: 2024-02-13 | End: 2024-02-14 | Stop reason: HOSPADM

## 2024-02-13 RX ORDER — PROCHLORPERAZINE MALEATE 10 MG
10 TABLET ORAL EVERY 6 HOURS PRN
Status: DISCONTINUED | OUTPATIENT
Start: 2024-02-13 | End: 2024-02-14 | Stop reason: HOSPADM

## 2024-02-13 RX ORDER — NALOXONE HYDROCHLORIDE 0.4 MG/ML
0.2 INJECTION, SOLUTION INTRAMUSCULAR; INTRAVENOUS; SUBCUTANEOUS
Status: DISCONTINUED | OUTPATIENT
Start: 2024-02-13 | End: 2024-02-14 | Stop reason: HOSPADM

## 2024-02-13 RX ORDER — LIDOCAINE 40 MG/G
CREAM TOPICAL
Status: DISCONTINUED | OUTPATIENT
Start: 2024-02-13 | End: 2024-02-14 | Stop reason: HOSPADM

## 2024-02-13 RX ORDER — ONDANSETRON 4 MG/1
4 TABLET, ORALLY DISINTEGRATING ORAL EVERY 6 HOURS PRN
Status: DISCONTINUED | OUTPATIENT
Start: 2024-02-13 | End: 2024-02-14 | Stop reason: HOSPADM

## 2024-02-13 RX ORDER — ONDANSETRON 2 MG/ML
4 INJECTION INTRAMUSCULAR; INTRAVENOUS
Status: DISCONTINUED | OUTPATIENT
Start: 2024-02-13 | End: 2024-02-14 | Stop reason: HOSPADM

## 2024-02-13 RX ORDER — FLUMAZENIL 0.1 MG/ML
0.2 INJECTION, SOLUTION INTRAVENOUS
Status: ACTIVE | OUTPATIENT
Start: 2024-02-13 | End: 2024-02-13

## 2024-02-13 RX ORDER — FENTANYL CITRATE 50 UG/ML
INJECTION, SOLUTION INTRAMUSCULAR; INTRAVENOUS PRN
Status: DISCONTINUED | OUTPATIENT
Start: 2024-02-13 | End: 2024-02-13 | Stop reason: HOSPADM

## 2024-02-13 RX ORDER — ONDANSETRON 2 MG/ML
4 INJECTION INTRAMUSCULAR; INTRAVENOUS EVERY 6 HOURS PRN
Status: DISCONTINUED | OUTPATIENT
Start: 2024-02-13 | End: 2024-02-14 | Stop reason: HOSPADM

## 2024-02-13 NOTE — LETTER
Jesus Manuel Rogers  [address]  [place] [facility] [address]  February 16, 2024    Dear Jesus Manuel,  This letter is to inform you of the results of your pathology report from your colonoscopy.  Your pathology report was:  Final Diagnosis   A.  CECAL POLYP, BIOPSY:  -Tubular adenoma  -Negative for high-grade dysplasia  B.  RECTOSIGMOID POLYP, BIOPSY:  -Tubulovillous adenoma  -Negative for high-grade dysplasia  C.  RECTAL POLYP, BIOPSY:  -Tubulovillous adenoma  -Negative for high-grade dysplasia   These are benign polyps. These types of polyps do carry a small risk of developing into a cancer over time if not removed. Yours were completely removed at the time of your colonoscopy. You should have another surveillance colonoscopy in 3 years.  If you have further questions please don t hesitate to call our clinic at [phone].   Sincerely,     Sundar Reilly, DO

## 2024-02-13 NOTE — H&P
Patient seen for Endoscopy    HPI:  Patient is a 48 year old male here for endoscopy. Not taking blood thinning medications. No MI or CVA history. No issues with previous sedation. No recent acute illness.    Review Of Systems    Skin: negative  Ears/Nose/Throat: negative  Respiratory: No shortness of breath, dyspnea on exertion, cough, or hemoptysis  Cardiovascular: negative  Gastrointestinal: negative  Genitourinary: negative  Musculoskeletal: negative  Neurologic: negative  Hematologic/Lymphatic/Immunologic: negative  Endocrine: negative      Past Medical History:   Diagnosis Date    NO ACTIVE PROBLEMS        Past Surgical History:   Procedure Laterality Date    NO HISTORY OF SURGERY         Family History   Problem Relation Age of Onset    Hypertension Mother     Asthma No family hx of     C.A.D. No family hx of     Diabetes No family hx of     Cerebrovascular Disease No family hx of     Breast Cancer No family hx of     Cancer - colorectal No family hx of     Prostate Cancer No family hx of        Social History     Socioeconomic History    Marital status:      Spouse name: Amalia    Number of children: 3    Years of education: Not on file    Highest education level: Not on file   Occupational History    Occupation: sales for electrical manager     Employer: [employer]   Tobacco Use    Smoking status: Never    Smokeless tobacco: Current     Types: Chew   Substance and Sexual Activity    Alcohol use: Yes     Comment: 1 beer weekly    Drug use: No    Sexual activity: Yes     Partners: Female   Other Topics Concern     Service No    Blood Transfusions No    Caffeine Concern No    Occupational Exposure No    Hobby Hazards No    Sleep Concern No    Stress Concern No    Weight Concern No    Special Diet No    Back Care No    Exercise Yes     Comment: 2-3 times per week    Bike Helmet No    Seat Belt Yes    Self-Exams No    Parent/sibling w/ CABG, MI or angioplasty before 65F 55M? No   Social History Narrative     Not on file     Social Determinants of Health     Financial Resource Strain: Low Risk  (1/11/2024)    Financial Resource Strain     Within the past 12 months, have you or your family members you live with been unable to get utilities (heat, electricity) when it was really needed?: No   Food Insecurity: Low Risk  (1/11/2024)    Food Insecurity     Within the past 12 months, did you worry that your food would run out before you got money to buy more?: No     Within the past 12 months, did the food you bought just not last and you didn t have money to get more?: No   Transportation Needs: Low Risk  (1/11/2024)    Transportation Needs     Within the past 12 months, has lack of transportation kept you from medical appointments, getting your medicines, non-medical meetings or appointments, work, or from getting things that you need?: No   Physical Activity: Not on file   Stress: Not on file   Social Connections: Not on file   Interpersonal Safety: Low Risk  (11/27/2023)    Interpersonal Safety     Do you feel physically and emotionally safe where you currently live?: Yes     Within the past 12 months, have you been hit, slapped, kicked or otherwise physically hurt by someone?: No     Within the past 12 months, have you been humiliated or emotionally abused in other ways by your partner or ex-partner?: No   Housing Stability: Low Risk  (1/11/2024)    Housing Stability     Do you have housing? : Yes     Are you worried about losing your housing?: No       Current Outpatient Medications   Medication Sig Dispense Refill    hydrochlorothiazide (HYDRODIURIL) 25 MG tablet Take 1 tablet (25 mg) by mouth every morning For blood pressure. 90 tablet 3       Medications and history reviewed    Physical exam:  Vitals: /86   Pulse 57   Temp 97.9  F (36.6  C) (Temporal)   Resp 16   Wt 91.2 kg (201 lb)   SpO2 97%   BMI 28.43 kg/m    BMI= Body mass index is 28.43 kg/m .    Constitutional: Healthy, alert, non-distressed    Head: Normo-cephalic, atraumatic, no lesions, masses or tenderness   Cardiovascular: RRR, no new murmurs, +S1, +S2 heart sounds, no clicks, rubs or gallops   Respiratory: CTAB, no rales, rhonchi or wheezing, equal chest rise, good respiratory effort   Gastrointestinal: Soft, non-tender, non distended, no rebound rigidity or guarding, no masses or hernias palpated   : Deferred  Musculoskeletal: Moves all extremities, normal  strength, no deformities noted   Skin: No suspicious lesions or rashes   Psychiatric: Mentation appears normal, affect appropriate   Hematologic/Lymphatic/Immunologic: Normal cervical and supraclavicular lymph nodes   Patient able to get up on table without difficulty.    Labs show:  No results found for this or any previous visit (from the past 24 hour(s)).    Assessment: Endoscopy  Plan: Pt cleared for anesthesia for proposed procedure.    Sundar Reilly, DO

## 2024-02-15 LAB
PATH REPORT.COMMENTS IMP SPEC: NORMAL
PATH REPORT.COMMENTS IMP SPEC: NORMAL
PATH REPORT.FINAL DX SPEC: NORMAL
PATH REPORT.GROSS SPEC: NORMAL
PATH REPORT.MICROSCOPIC SPEC OTHER STN: NORMAL
PATH REPORT.RELEVANT HX SPEC: NORMAL
PHOTO IMAGE: NORMAL

## 2024-02-20 ENCOUNTER — HOSPITAL ENCOUNTER (OUTPATIENT)
Dept: NUTRITION | Facility: CLINIC | Age: 49
Discharge: HOME | End: 2024-02-20
Attending: FAMILY MEDICINE | Admitting: FAMILY MEDICINE
Payer: COMMERCIAL

## 2024-02-20 DIAGNOSIS — I10 ESSENTIAL HYPERTENSION WITH GOAL BLOOD PRESSURE LESS THAN 140/90: ICD-10-CM

## 2024-02-20 PROCEDURE — 97802 MEDICAL NUTRITION INDIV IN: CPT | Mod: GT,95 | Performed by: DIETITIAN, REGISTERED

## 2024-02-20 NOTE — PROGRESS NOTES
"[facility]  Medical Nutrition Therapy    Visit Type: Initial Assessment    Jesus Manuel Rogers, 48 year old referred by Kuldeep Salmeron MD for MNT related to I10 (ICD-10-CM) - Essential hypertension with goal blood pressure less than 140/90    Virtual Visit Details    Type of service:  Video Visit     Originating Location (pt. Location): Home    Distant Location (provider location):  On-site  Platform used for Video Visit: DigiMeld        Nutrition Assessment:  Anthropometrics  Height:   Ht Readings from Last 1 Encounters:   11/27/23 1.791 m (5' 10.5\")         BMI:  28.4   Weight Status:  Overweight BMI 25-29.9   Weight:   Wt Readings from Last 1 Encounters:   02/13/24 91.2 kg (201 lb)       UBW: 215 lb      IBW:  77 kg (169 lb) IBW %: 119%        Weight History:   Wt Readings from Last 10 Encounters:   02/13/24 91.2 kg (201 lb)   11/27/23 96.2 kg (212 lb 1.6 oz)   11/26/19 94.3 kg (208 lb)   11/12/19 94.3 kg (208 lb)   10/07/19 92.5 kg (204 lb)   08/23/19 94.7 kg (208 lb 11.2 oz)   06/27/18 95.7 kg (211 lb)   11/06/15 91.2 kg (201 lb)   03/03/15 90.9 kg (200 lb 8 oz)   01/03/14 92 kg (202 lb 12.8 oz)   -Wt loss of 11 lb (5.2%) over the past 3 months.   -Was maintaining weight at 215 lb for the past few years.     Goal Weight:   Feels good at his current wt.     Nutrition History    PMH:   Patient Active Problem List   Diagnosis     Tobacco abuse     Seborrheic dermatitis     CARDIOVASCULAR SCREENING; LDL GOAL LESS THAN 160     -Goal BP is less than 140/90.   -He follows a low salt diet at home.   -This is his first dietitian visit.   -Checks BP at home and it's been 130/83, 135/86, 147/96, 122/86, 137/79, 131/78. This is improved since the holidays. Stopped drinking coffee. Stopped salty snacks. Elevated blood pressure may be due to not feeling well. Was prescribed blood pressure meds and hasn't started them yet. Looking to manage BP without meds if able.   -BP used to be 120/80 so currently BP is higher than " it used to be.     Labs:   Recent Labs   Lab Test 11/27/23  0900 08/27/19  0740   CHOL 212* 201*   HDL 43 45   * 123*   TRIG 187* 163*     Hemoglobin A1C   Date Value Ref Range Status   11/27/2023 5.0 0.0 - 5.6 % Final     Comment:     Normal <5.7%   Prediabetes 5.7-6.4%    Diabetes 6.5% or higher     Note: Adopted from ADA consensus guidelines.     Meds:   Current Outpatient Medications   Medication Instructions     hydrochlorothiazide (HYDRODIURIL) 25 mg, Oral, EVERY MORNING, For blood pressure.       Supplements: reviewed      Social/Environmental:   -average sleep per night: not discussed   -level of stress: not discussed      Food Record  Breakfast: Used to eat 2 eggs and hashbrowns. Now has cereal (Rice Krispies or Cheerios or Raisin Bran) with skim milk.  Lunch: Leftovers from dinner or sandwiches (potato or white bread or whole wheat bread, meat, cheese or tuna or egg salad or corned beef).   Dinner: Restaurant or cooked at home (brisket or chicken grilled, baked - skinless), burgers on grill with bagged salad or canned corn or green beans or stew with carrots and celery or ham and bean soup with veggies   Snacks: Ice cream (about 2 days per week) or popcorn or fruit bars   Beverages: Stopped drinking 3 cups of coffee. Drinks tea in the morning and drinks a few glasses of water and Pure Leaf unsweetend tea. Sometimes a glass of milk.   -Take-out 3 days per week- travels for work. Could be eating [place] food or salad bowls. Rarely chooses fast food. Sometimes buffets - bagel with egg and mathis.   -Doesn't use the salt shaker - never has. Puts seasoned salt on chicken or No Salt garlic and herb seasoning. Sometimes [employer] seasoned salt on chicken.     Nutritional Details:   -Food allergies: none  -Food intolerances: none  -Food sensitivities: none  -GI concerns: none  -Appetite: good   -Pace of eating: not discussed  -Role of cooking: self and wife  -Role of food shopping: self and wife       Physical  Activity:  -Exercises a few days per week. Skates once per week with a group.   -Walks 40 min with the dogs every day.      ASSESSED MALNUTRITION STATUS  % Weight Loss:  Weight loss does not meet criteria for malnutrition   % Intake:  No decreased intake noted  Subcutaneous Fat Loss:  Unable to determine  Loss of Muscle Mass:  Unable to determine   Fluid Retention:  None noted    Malnutrition Diagnosis:  Patient does not meet two of the above criteria necessary for diagnosing malnutrition      Nutrition Diagnosis:  Food and nutrition-related knowledge deficit related to limited prior exposure as evidenced by BMI 28.9, usual dietary intake exceeding sodium needs, dx hypertension, and report of not knowing nutrition guidelines for managing blood pressure.         Nutrition Intervention:  Nutrition Prescription Summary: MNT for Hypertension     Nutrition Education (Content):  -Educated pt on meal planning using MyPlate and the importance of consuming a balanced diet including appropriate servings from all food groups  -Educated pt on label reading  -Educated pt on heart healthy nutrition therapy (choosing WGs, fresh fruits & veggies, and lean protein sources)  -Discussed limiting saturated fats and avoiding trans fats; suggested eating more plant-based meals  -Discussed eating more whole, unprocessed foods to limit sodium intake    -Educated patient on label reading guidelines for sodium and suggested staying below 2,000 mg per day.   -Educated patient on healthy food swaps to decrease sodium intake.   -Educated patient on dining out healthfully.   -Discussed hydration needs  -Discussed the importance of PA and recommended making it a goal to get 30-60 min per day of exercise    Emailed/mailed information discussed including nutrition handouts to patient.       Nutrition Prescription: Macronutrient and Micronutrient details   Dosing weight: Current wt (91 kg) for energy and fluids, IBW (77 kg) for protein  Energy:  3556-8552 kcals/day ([place] Jeor)    Justification:  (overweight, to promote a 1 lb wt loss per week)   Protein: 77-92 g Pro/day (1-1.2 g pro/Kg)    Justification:  (maintenance)   Fluid: 4035-4677 mL/day   (1 mL/Kcal)     Justification:  (overweight)   Fiber:     Men (19-50 years): 38 grams per day         Carbohydrate: 45-65% kcal   <25 g added sugar/day       Fat: 20-35% kcal  <7% kcal from saturated fat   Micronutrient: DRI  <2,000 mg sodium/day            Vitamin and Mineral Supplements: n/a       Patient Engagement:   Assessed learning needs and learning preference: Yes.  Teaching Method(s) used: Explanation    Nutrition Education (Application):  a)  Discussed current eating plans / recommended alternative food choices    b)  Patient verbalizes understanding of diet by asking questions      Anticipate >75% compliance   Stage of Change:  preparation      Nutrition Goals:  1) Limit sodium to <2,000 mg/day (<150 mg per serving, <600 mg per meal)  2) Use My Plate for meal planning   3) Aim to add more fruits and veggies to meals and snacks  4) Limit intake of processed meats including sausage, pepperoni, mathis, salami, etc.     Nutrition Follow Up / Monitoring:   Weight, PO intake, PA, BP      Nutrition Recommendations:  Patient to follow-up with RD in 8 weeks.  Patient has RD contact information to call/email if needed.      Start time: 11:15  End time: 12:17    Total Time Duration: 62 min      Dian Ma MS, RD, LD, [facility]  Clinical Dietitian  [phone]

## 2024-06-05 ENCOUNTER — MYC REFILL (OUTPATIENT)
Dept: FAMILY MEDICINE | Facility: CLINIC | Age: 49
End: 2024-06-05

## 2024-06-05 ENCOUNTER — MYC MEDICAL ADVICE (OUTPATIENT)
Dept: FAMILY MEDICINE | Facility: CLINIC | Age: 49
End: 2024-06-05

## 2024-06-05 ENCOUNTER — VIRTUAL VISIT (OUTPATIENT)
Dept: FAMILY MEDICINE | Facility: CLINIC | Age: 49
End: 2024-06-05
Payer: COMMERCIAL

## 2024-06-05 DIAGNOSIS — I10 ESSENTIAL HYPERTENSION WITH GOAL BLOOD PRESSURE LESS THAN 140/90: Primary | ICD-10-CM

## 2024-06-05 DIAGNOSIS — I10 ESSENTIAL HYPERTENSION WITH GOAL BLOOD PRESSURE LESS THAN 140/90: ICD-10-CM

## 2024-06-05 PROCEDURE — 99213 OFFICE O/P EST LOW 20 MIN: CPT | Mod: 95 | Performed by: INTERNAL MEDICINE

## 2024-06-05 RX ORDER — HYDROCHLOROTHIAZIDE 25 MG/1
25 TABLET ORAL EVERY MORNING
Qty: 90 TABLET | Refills: 3 | OUTPATIENT
Start: 2024-06-05

## 2024-06-05 NOTE — PROGRESS NOTES
"    Instructions Relayed to Patient by Virtual Roomer:     Patient is active on [employer]:   Relayed following to patient: \"It looks like you are active on [employer], are you able to join the visit this way? If not, do you need us to send you a link now or would you like your provider to send a link via text or email when they are ready to initiate the visit?\"      Patient Confirmed they will join visit via: Email   Reminded patient to ensure they were logged on to virtual visit by arrival time listed.   Asked if patient has flexibility to initiate visit sooner than arrival time: patient stated yes, documented in appointment notes availability to initiate visit earlier than arrival time     If pediatric virtual visit, ensured pediatric patient along with parent/guardian will be present for video visit.     Patient offered the website [email or web address] and/or phone number to [employer] Help line: [phone]    Jesus Manuel is a 49 year old who is being evaluated via a billable video visit.    How would you like to obtain your AVS? [email or web address]  If the video visit is dropped, the invitation should be resent by: Text to cell phone: [phone]  Will anyone else be joining your video visit? No      Assessment & Plan     Essential hypertension with goal blood pressure less than 140/90  He is here to discuss about his hypertension   he had elevated blood pressure when he saw me in November so he followed up with a physician in January and was placed on hydrochlorothiazide  He has been taking that every day  No side effects from the same however blood pressures have been occasionally still elevated  I reviewed the home blood pressure readings with him  Around 30 to 35% of the time the systolic is more than 140 and diastolic is more than 90  He continues to work with diet and exercise  We discussed about the options today in particular adding a second antihypertensive versus continuing to monitor at home versus doing a " "24-hour blood pressure monitor  We decided that a 24-hour blood pressure monitor might give us more accurate data points so we will proceed with that  - 24 Hour Blood Pressure Monitor - Adult; Future          BMI  Estimated body mass index is 28.43 kg/m  as calculated from the following:    Height as of 11/27/23: 1.791 m (5' 10.5\").    Weight as of 2/13/24: 91.2 kg (201 lb).             Real Ken is a 49 year old, presenting for the following health issues:  Recheck Medication (Blood pressure medication)      6/5/2024     7:03 AM   Additional Questions   Roomed by Saundra SCHULER   Accompanied by Self         6/5/2024     7:03 AM   Patient Reported Additional Medications   Patient reports taking the following new medications None     History of Present Illness       Hypertension: He presents for follow up of hypertension.  He does check blood pressure  regularly outside of the clinic. Outside blood pressures have been over 140/90. He follows a low salt diet.     He eats 0-1 servings of fruits and vegetables daily.He consumes 0 sweetened beverage(s) daily.He exercises with enough effort to increase his heart rate 30 to 60 minutes per day.  He exercises with enough effort to increase his heart rate 3 or less days per week.   He is taking medications regularly.               Review of Systems  Constitutional, HEENT, cardiovascular, pulmonary, gi and gu systems are negative, except as otherwise noted.      Objective           Vitals:  No vitals were obtained today due to virtual visit.    Physical Exam   GENERAL: alert and no distress  EYES: Eyes grossly normal to inspection.  No discharge or erythema, or obvious scleral/conjunctival abnormalities.  RESP: No audible wheeze, cough, or visible cyanosis.    SKIN: Visible skin clear. No significant rash, abnormal pigmentation or lesions.  NEURO: Cranial nerves grossly intact.  Mentation and speech appropriate for age.  PSYCH: Appropriate affect, tone, and pace of " words          Video-Visit Details    Type of service:  Video Visit   Originating Location (pt. Location): Home    Distant Location (provider location):  Off-site  Platform used for Video Visit: Savi  Signed Electronically by: Eze Tang MD

## 2024-07-22 NOTE — TELEPHONE ENCOUNTER
Patient Quality Outreach    Patient is due for the following:   Hypertension -  Hypertension follow-up visit      Topic Date Due    Hepatitis B Vaccine (1 of 3 - 19+ 3-dose series) Never done    Zoster (Shingles) Vaccine (1 of 2) Never done    COVID-19 Vaccine (4 - 2023-24 season) 09/01/2023       Next Steps:   Schedule a office visit for HTN    Type of outreach:    Sent [employer] message.      Questions for provider review:    None           Amada Quinn

## 2024-08-20 ENCOUNTER — TELEPHONE (OUTPATIENT)
Dept: FAMILY MEDICINE | Facility: CLINIC | Age: 49
End: 2024-08-20
Payer: COMMERCIAL

## 2024-08-20 NOTE — TELEPHONE ENCOUNTER
[facility]2 [facility]t provider out of clinic  Zulma Oliver CMA

## 2024-09-18 ENCOUNTER — TELEPHONE (OUTPATIENT)
Dept: FAMILY MEDICINE | Facility: CLINIC | Age: 49
End: 2024-09-18
Payer: COMMERCIAL

## 2024-09-18 NOTE — TELEPHONE ENCOUNTER
LVM for pt that appt needs to be rescheduled as Dr. Tang will be out of the office on 12/02/24.  [facility]

## 2024-12-24 ENCOUNTER — TELEPHONE (OUTPATIENT)
Dept: FAMILY MEDICINE | Facility: CLINIC | Age: 49
End: 2024-12-24
Payer: COMMERCIAL

## 2024-12-24 NOTE — TELEPHONE ENCOUNTER
Patient Quality Outreach    Patient is due for the following:   Hypertension -  Hypertension follow-up visit  Physical Preventive Adult Physical      Topic Date Due    Hepatitis B Vaccine (1 of 3 - 19+ 3-dose series) Never done    Pneumococcal Vaccine (1 of 1 - PCV) Never done    Zoster (Shingles) Vaccine (1 of 2) Never done    Flu Vaccine (1) 09/01/2024    COVID-19 Vaccine (4 - 2024-25 season) 09/01/2024       Action(s) Taken:   Schedule a Adult Preventative    Type of outreach:    Sent [employer] message.    Questions for provider review:    None           Amada Quinn

## 2025-01-05 ENCOUNTER — HEALTH MAINTENANCE LETTER (OUTPATIENT)
Age: 50
End: 2025-01-05

## 2025-03-17 ENCOUNTER — OFFICE VISIT (OUTPATIENT)
Dept: FAMILY MEDICINE | Facility: CLINIC | Age: 50
End: 2025-03-17
Payer: COMMERCIAL

## 2025-03-17 VITALS
HEART RATE: 76 BPM | HEIGHT: 71 IN | OXYGEN SATURATION: 97 % | RESPIRATION RATE: 16 BRPM | TEMPERATURE: 98.3 F | BODY MASS INDEX: 29.97 KG/M2 | WEIGHT: 214.1 LBS | DIASTOLIC BLOOD PRESSURE: 82 MMHG | SYSTOLIC BLOOD PRESSURE: 133 MMHG

## 2025-03-17 DIAGNOSIS — J20.9 ACUTE BRONCHITIS, UNSPECIFIED ORGANISM: Primary | ICD-10-CM

## 2025-03-17 DIAGNOSIS — I10 ESSENTIAL HYPERTENSION WITH GOAL BLOOD PRESSURE LESS THAN 140/90: ICD-10-CM

## 2025-03-17 PROCEDURE — 3075F SYST BP GE 130 - 139MM HG: CPT | Performed by: FAMILY MEDICINE

## 2025-03-17 PROCEDURE — G2211 COMPLEX E/M VISIT ADD ON: HCPCS | Performed by: FAMILY MEDICINE

## 2025-03-17 PROCEDURE — 1126F AMNT PAIN NOTED NONE PRSNT: CPT | Performed by: FAMILY MEDICINE

## 2025-03-17 PROCEDURE — 3079F DIAST BP 80-89 MM HG: CPT | Performed by: FAMILY MEDICINE

## 2025-03-17 PROCEDURE — 99213 OFFICE O/P EST LOW 20 MIN: CPT | Performed by: FAMILY MEDICINE

## 2025-03-17 RX ORDER — PREDNISONE 20 MG/1
40 TABLET ORAL DAILY
Qty: 10 TABLET | Refills: 0 | Status: SHIPPED | OUTPATIENT
Start: 2025-03-17 | End: 2025-03-22

## 2025-03-17 RX ORDER — AZITHROMYCIN 250 MG/1
TABLET, FILM COATED ORAL
Qty: 6 TABLET | Refills: 0 | Status: SHIPPED | OUTPATIENT
Start: 2025-03-17 | End: 2025-03-22

## 2025-03-17 ASSESSMENT — PAIN SCALES - GENERAL: PAINLEVEL_OUTOF10: NO PAIN (0)

## 2025-03-17 NOTE — PROGRESS NOTES
"  Assessment & Plan     Acute bronchitis, unspecified organism  In some sense illness symptoms are couple of months old and things have waxed and waned since then.  Getting worse in the last week.  No fever or chills.  Cough a little more productive.  Still able to play hockey but notes breathing is a little bit different.  Exam is some bronchial breath sounds but no focal consolidation.  Discussed mechanism of action of the proposed medication, as well as potential effects, both good and bad.  Patient expressed understanding and agreed with treatment.   - azithromycin (ZITHROMAX) 250 MG tablet; Take 2 tablets (500 mg) by mouth daily for 1 day, THEN 1 tablet (250 mg) daily for 4 days.  - predniSONE (DELTASONE) 20 MG tablet; Take 2 tablets (40 mg) by mouth daily for 5 days.    Essential hypertension with goal blood pressure less than 140/90  Stable on current regimen.  Continue same plan and routine follow-up.           BMI  Estimated body mass index is 30.29 kg/m  as calculated from the following:    Height as of this encounter: 1.791 m (5' 10.5\").    Weight as of this encounter: 97.1 kg (214 lb 1.6 oz).         Regular exercise  See Patient Instructions    Real Ken is a 49 year old, presenting for the following health issues:  URI (Cough and congestion)        3/17/2025     3:53 PM   Additional Questions   Roomed by Amada WILKINSON   Accompanied by self     History of Present Illness       Reason for visit:  Congestion  Symptom onset:  More than a month  Symptoms include:  Caugh  Symptom intensity:  Mild  Symptom progression:  Staying the same  Had these symptoms before:  No  What makes it worse:  No  What makes it better:  No   He is taking medications regularly.            Here today for illness symptoms as above      Review of Systems  Constitutional, HEENT, cardiovascular, pulmonary, gi and gu systems are negative, except as otherwise noted.      Objective    /82 (BP Location: Right arm, Patient Position: " "Sitting, Cuff Size: Adult Large)   Pulse 76   Temp 98.3  F (36.8  C) (Oral)   Resp 16   Ht 1.791 m (5' 10.5\")   Wt 97.1 kg (214 lb 1.6 oz)   SpO2 97%   BMI 30.29 kg/m    Body mass index is 30.29 kg/m .  Physical Exam   Alert, pleasant, upbeat, and in no apparent discomfort.  S1 and S2 normal, no murmurs, clicks, gallops or rubs. Regular rate and rhythm. Chest is clear; no wheezes or rales. No edema or JVD.  Past labs reviewed with the patient.             Signed Electronically by: Betty Delgadillo MD    "

## 2025-05-11 DIAGNOSIS — I10 ESSENTIAL HYPERTENSION WITH GOAL BLOOD PRESSURE LESS THAN 140/90: ICD-10-CM

## 2025-05-12 RX ORDER — HYDROCHLOROTHIAZIDE 25 MG/1
TABLET ORAL
Qty: 90 TABLET | Refills: 0 | Status: SHIPPED | OUTPATIENT
Start: 2025-05-12

## 2025-05-26 ENCOUNTER — PATIENT OUTREACH (OUTPATIENT)
Dept: CARE COORDINATION | Facility: CLINIC | Age: 50
End: 2025-05-26
Payer: COMMERCIAL

## 2025-08-14 DIAGNOSIS — I10 ESSENTIAL HYPERTENSION WITH GOAL BLOOD PRESSURE LESS THAN 140/90: ICD-10-CM

## 2025-08-14 RX ORDER — HYDROCHLOROTHIAZIDE 25 MG/1
TABLET ORAL
Qty: 90 TABLET | Refills: 0 | Status: SHIPPED | OUTPATIENT
Start: 2025-08-14

## (undated) DEVICE — KIT ENDO FIRST STEP DISINFECTANT 200ML W/POUCH EP-4

## (undated) DEVICE — CLIP HEMOSTASIS ASSURANCE W16 MM BX00711884

## (undated) DEVICE — PAD CHUX UNDERPAD 23X24" 7136

## (undated) RX ORDER — FENTANYL CITRATE 50 UG/ML
INJECTION, SOLUTION INTRAMUSCULAR; INTRAVENOUS
Status: DISPENSED
Start: 2024-02-13